# Patient Record
Sex: FEMALE | Race: WHITE | Employment: FULL TIME | URBAN - NONMETROPOLITAN AREA
[De-identification: names, ages, dates, MRNs, and addresses within clinical notes are randomized per-mention and may not be internally consistent; named-entity substitution may affect disease eponyms.]

---

## 2017-07-31 ENCOUNTER — HOSPITAL ENCOUNTER (OUTPATIENT)
Age: 22
Setting detail: OBSERVATION
Discharge: HOME | End: 2017-07-31
Attending: OBSTETRICS & GYNECOLOGY | Admitting: OBSTETRICS & GYNECOLOGY
Payer: COMMERCIAL

## 2017-07-31 VITALS
TEMPERATURE: 97.3 F | DIASTOLIC BLOOD PRESSURE: 78 MMHG | HEIGHT: 67 IN | RESPIRATION RATE: 18 BRPM | HEART RATE: 109 BPM | OXYGEN SATURATION: 100 % | WEIGHT: 200 LBS | SYSTOLIC BLOOD PRESSURE: 114 MMHG | BODY MASS INDEX: 31.39 KG/M2

## 2017-07-31 PROCEDURE — G0378 HOSPITAL OBSERVATION PER HR: HCPCS

## 2017-07-31 PROCEDURE — G0379 DIRECT REFER HOSPITAL OBSERV: HCPCS

## 2017-09-02 ENCOUNTER — HOSPITAL ENCOUNTER (OUTPATIENT)
Age: 22
Setting detail: OBSERVATION
Discharge: HOME | End: 2017-09-02
Attending: OBSTETRICS & GYNECOLOGY | Admitting: OBSTETRICS & GYNECOLOGY
Payer: COMMERCIAL

## 2017-09-02 VITALS
SYSTOLIC BLOOD PRESSURE: 131 MMHG | HEART RATE: 114 BPM | BODY MASS INDEX: 32.33 KG/M2 | DIASTOLIC BLOOD PRESSURE: 80 MMHG | HEIGHT: 67 IN | TEMPERATURE: 98.8 F | WEIGHT: 206 LBS

## 2017-09-02 PROCEDURE — 84112 EVAL AMNIOTIC FLUID PROTEIN: CPT

## 2017-09-02 PROCEDURE — G0378 HOSPITAL OBSERVATION PER HR: HCPCS

## 2017-09-02 PROCEDURE — G0379 DIRECT REFER HOSPITAL OBSERV: HCPCS

## 2017-09-02 RX ORDER — ACETAMINOPHEN 500 MG
1000 TABLET ORAL EVERY 6 HOURS PRN
COMMUNITY
End: 2021-01-07

## 2017-09-03 LAB — AMNISURE PATIENT RESULT: NORMAL

## 2017-10-04 ENCOUNTER — HOSPITAL ENCOUNTER (INPATIENT)
Age: 22
LOS: 2 days | Discharge: HOME | End: 2017-10-07
Attending: OBSTETRICS & GYNECOLOGY | Admitting: OBSTETRICS & GYNECOLOGY
Payer: COMMERCIAL

## 2017-10-04 LAB
ABO: NORMAL
ANTIBODY SCREEN: NORMAL
HCT VFR BLD CALC: 32.7 % (ref 37–47)
HEMOGLOBIN: 10.9 GM/DL (ref 12–16)
MCH RBC QN AUTO: 27.3 PG (ref 27–31)
MCHC RBC AUTO-ENTMCNC: 33.2 GM/DL (ref 33–37)
MCV RBC AUTO: 82 FL (ref 81–99)
PDW BLD-RTO: 14.5 % (ref 11.5–14.5)
PLATELET # BLD: 158 THOU/MM3 (ref 130–400)
PMV BLD AUTO: 9.9 MCM (ref 7.4–10.4)
RBC # BLD: 3.98 MILL/MM3 (ref 4.2–5.4)
RH FACTOR: NORMAL
WBC # BLD: 8.4 THOU/MM3 (ref 4.8–10.8)

## 2017-10-04 PROCEDURE — 6360000002 HC RX W HCPCS: Performed by: OBSTETRICS & GYNECOLOGY

## 2017-10-04 PROCEDURE — 86900 BLOOD TYPING SEROLOGIC ABO: CPT

## 2017-10-04 PROCEDURE — 1220000001 HC SEMI PRIVATE L&D R&B

## 2017-10-04 PROCEDURE — 36415 COLL VENOUS BLD VENIPUNCTURE: CPT

## 2017-10-04 PROCEDURE — A6258 TRANSPARENT FILM >16<=48 IN: HCPCS

## 2017-10-04 PROCEDURE — 6360000002 HC RX W HCPCS

## 2017-10-04 PROCEDURE — 2580000003 HC RX 258: Performed by: OBSTETRICS & GYNECOLOGY

## 2017-10-04 PROCEDURE — 86592 SYPHILIS TEST NON-TREP QUAL: CPT

## 2017-10-04 PROCEDURE — 86901 BLOOD TYPING SEROLOGIC RH(D): CPT

## 2017-10-04 PROCEDURE — 86850 RBC ANTIBODY SCREEN: CPT

## 2017-10-04 PROCEDURE — 85027 COMPLETE CBC AUTOMATED: CPT

## 2017-10-04 RX ORDER — ACETAMINOPHEN 325 MG/1
650 TABLET ORAL EVERY 4 HOURS PRN
Status: DISCONTINUED | OUTPATIENT
Start: 2017-10-04 | End: 2017-10-05 | Stop reason: HOSPADM

## 2017-10-04 RX ORDER — TERBUTALINE SULFATE 1 MG/ML
0.25 INJECTION, SOLUTION SUBCUTANEOUS ONCE
Status: DISCONTINUED | OUTPATIENT
Start: 2017-10-04 | End: 2017-10-05 | Stop reason: HOSPADM

## 2017-10-04 RX ORDER — BUTORPHANOL TARTRATE 1 MG/ML
1 INJECTION, SOLUTION INTRAMUSCULAR; INTRAVENOUS
Status: DISCONTINUED | OUTPATIENT
Start: 2017-10-04 | End: 2017-10-05 | Stop reason: HOSPADM

## 2017-10-04 RX ORDER — METHYLERGONOVINE MALEATE 0.2 MG/ML
200 INJECTION INTRAVENOUS PRN
Status: DISCONTINUED | OUTPATIENT
Start: 2017-10-04 | End: 2017-10-05

## 2017-10-04 RX ORDER — SODIUM CHLORIDE, SODIUM LACTATE, POTASSIUM CHLORIDE, CALCIUM CHLORIDE 600; 310; 30; 20 MG/100ML; MG/100ML; MG/100ML; MG/100ML
INJECTION, SOLUTION INTRAVENOUS CONTINUOUS
Status: DISCONTINUED | OUTPATIENT
Start: 2017-10-04 | End: 2017-10-05

## 2017-10-04 RX ORDER — ONDANSETRON 2 MG/ML
8 INJECTION INTRAMUSCULAR; INTRAVENOUS EVERY 6 HOURS PRN
Status: DISCONTINUED | OUTPATIENT
Start: 2017-10-04 | End: 2017-10-05 | Stop reason: HOSPADM

## 2017-10-04 RX ORDER — DIPHENHYDRAMINE HYDROCHLORIDE 50 MG/ML
25 INJECTION INTRAMUSCULAR; INTRAVENOUS EVERY 4 HOURS PRN
Status: DISCONTINUED | OUTPATIENT
Start: 2017-10-04 | End: 2017-10-05

## 2017-10-04 RX ORDER — CARBOPROST TROMETHAMINE 250 UG/ML
250 INJECTION, SOLUTION INTRAMUSCULAR PRN
Status: DISCONTINUED | OUTPATIENT
Start: 2017-10-04 | End: 2017-10-05 | Stop reason: HOSPADM

## 2017-10-04 RX ORDER — LIDOCAINE HYDROCHLORIDE 10 MG/ML
30 INJECTION, SOLUTION EPIDURAL; INFILTRATION; INTRACAUDAL; PERINEURAL PRN
Status: DISCONTINUED | OUTPATIENT
Start: 2017-10-04 | End: 2017-10-05 | Stop reason: HOSPADM

## 2017-10-04 RX ORDER — MISOPROSTOL 200 UG/1
1000 TABLET ORAL PRN
Status: DISCONTINUED | OUTPATIENT
Start: 2017-10-04 | End: 2017-10-05

## 2017-10-04 RX ORDER — IBUPROFEN 800 MG/1
800 TABLET ORAL EVERY 8 HOURS PRN
Status: DISCONTINUED | OUTPATIENT
Start: 2017-10-04 | End: 2017-10-05

## 2017-10-04 RX ADMIN — SODIUM CHLORIDE, POTASSIUM CHLORIDE, SODIUM LACTATE AND CALCIUM CHLORIDE: 600; 310; 30; 20 INJECTION, SOLUTION INTRAVENOUS at 22:57

## 2017-10-04 RX ADMIN — SODIUM CHLORIDE, POTASSIUM CHLORIDE, SODIUM LACTATE AND CALCIUM CHLORIDE: 600; 310; 30; 20 INJECTION, SOLUTION INTRAVENOUS at 22:00

## 2017-10-04 RX ADMIN — Medication 1 MILLI-UNITS/MIN: at 22:09

## 2017-10-04 RX ADMIN — BUTORPHANOL TARTRATE 1 MG: 1 INJECTION, SOLUTION INTRAMUSCULAR; INTRAVENOUS at 23:54

## 2017-10-04 ASSESSMENT — PAIN SCALES - GENERAL: PAINLEVEL_OUTOF10: 5

## 2017-10-05 ENCOUNTER — ANESTHESIA (OUTPATIENT)
Dept: LABOR AND DELIVERY | Age: 22
End: 2017-10-05
Payer: COMMERCIAL

## 2017-10-05 ENCOUNTER — ANESTHESIA EVENT (OUTPATIENT)
Dept: LABOR AND DELIVERY | Age: 22
End: 2017-10-05
Payer: COMMERCIAL

## 2017-10-05 LAB — RPR: NONREACTIVE

## 2017-10-05 PROCEDURE — 2580000003 HC RX 258: Performed by: OBSTETRICS & GYNECOLOGY

## 2017-10-05 PROCEDURE — 2500000003 HC RX 250 WO HCPCS: Performed by: ANESTHESIOLOGY

## 2017-10-05 PROCEDURE — 1200000000 HC SEMI PRIVATE

## 2017-10-05 PROCEDURE — 7200000001 HC VAGINAL DELIVERY

## 2017-10-05 PROCEDURE — 3700000025 ANESTHESIA EPIDURAL BLOCK: Performed by: ANESTHESIOLOGY

## 2017-10-05 PROCEDURE — 6370000000 HC RX 637 (ALT 250 FOR IP): Performed by: OBSTETRICS & GYNECOLOGY

## 2017-10-05 RX ORDER — IBUPROFEN 800 MG/1
800 TABLET ORAL 3 TIMES DAILY
Status: DISCONTINUED | OUTPATIENT
Start: 2017-10-05 | End: 2017-10-07 | Stop reason: HOSPADM

## 2017-10-05 RX ORDER — SODIUM CHLORIDE, SODIUM LACTATE, POTASSIUM CHLORIDE, CALCIUM CHLORIDE 600; 310; 30; 20 MG/100ML; MG/100ML; MG/100ML; MG/100ML
INJECTION, SOLUTION INTRAVENOUS CONTINUOUS
Status: DISCONTINUED | OUTPATIENT
Start: 2017-10-05 | End: 2017-10-07 | Stop reason: HOSPADM

## 2017-10-05 RX ORDER — LANOLIN 100 %
OINTMENT (GRAM) TOPICAL PRN
Status: DISCONTINUED | OUTPATIENT
Start: 2017-10-05 | End: 2017-10-07 | Stop reason: HOSPADM

## 2017-10-05 RX ORDER — HYDROCODONE BITARTRATE AND ACETAMINOPHEN 5; 325 MG/1; MG/1
2 TABLET ORAL EVERY 4 HOURS PRN
Status: DISCONTINUED | OUTPATIENT
Start: 2017-10-05 | End: 2017-10-07 | Stop reason: HOSPADM

## 2017-10-05 RX ORDER — SODIUM CHLORIDE 0.9 % (FLUSH) 0.9 %
10 SYRINGE (ML) INJECTION EVERY 12 HOURS SCHEDULED
Status: DISCONTINUED | OUTPATIENT
Start: 2017-10-05 | End: 2017-10-07 | Stop reason: HOSPADM

## 2017-10-05 RX ORDER — HYDROCODONE BITARTRATE AND ACETAMINOPHEN 5; 325 MG/1; MG/1
1 TABLET ORAL EVERY 4 HOURS PRN
Status: DISCONTINUED | OUTPATIENT
Start: 2017-10-05 | End: 2017-10-07 | Stop reason: HOSPADM

## 2017-10-05 RX ORDER — ACETAMINOPHEN 325 MG/1
650 TABLET ORAL EVERY 4 HOURS PRN
Status: DISCONTINUED | OUTPATIENT
Start: 2017-10-05 | End: 2017-10-07 | Stop reason: HOSPADM

## 2017-10-05 RX ORDER — MORPHINE SULFATE 2 MG/ML
4 INJECTION, SOLUTION INTRAMUSCULAR; INTRAVENOUS
Status: DISCONTINUED | OUTPATIENT
Start: 2017-10-05 | End: 2017-10-07 | Stop reason: HOSPADM

## 2017-10-05 RX ORDER — ONDANSETRON 2 MG/ML
4 INJECTION INTRAMUSCULAR; INTRAVENOUS EVERY 6 HOURS PRN
Status: DISCONTINUED | OUTPATIENT
Start: 2017-10-05 | End: 2017-10-07 | Stop reason: HOSPADM

## 2017-10-05 RX ORDER — CARBOPROST TROMETHAMINE 250 UG/ML
250 INJECTION, SOLUTION INTRAMUSCULAR
Status: ACTIVE | OUTPATIENT
Start: 2017-10-05 | End: 2017-10-05

## 2017-10-05 RX ORDER — SODIUM CHLORIDE 0.9 % (FLUSH) 0.9 %
10 SYRINGE (ML) INJECTION PRN
Status: DISCONTINUED | OUTPATIENT
Start: 2017-10-05 | End: 2017-10-07 | Stop reason: HOSPADM

## 2017-10-05 RX ORDER — MORPHINE SULFATE 2 MG/ML
2 INJECTION, SOLUTION INTRAMUSCULAR; INTRAVENOUS
Status: DISCONTINUED | OUTPATIENT
Start: 2017-10-05 | End: 2017-10-07 | Stop reason: HOSPADM

## 2017-10-05 RX ORDER — DIPHENHYDRAMINE HCL 25 MG
25 TABLET ORAL EVERY 4 HOURS PRN
Status: DISCONTINUED | OUTPATIENT
Start: 2017-10-05 | End: 2017-10-07 | Stop reason: HOSPADM

## 2017-10-05 RX ORDER — METHYLERGONOVINE MALEATE 0.2 MG/ML
200 INJECTION INTRAVENOUS SEE ADMIN INSTRUCTIONS
Status: DISCONTINUED | OUTPATIENT
Start: 2017-10-05 | End: 2017-10-07 | Stop reason: HOSPADM

## 2017-10-05 RX ORDER — FERROUS SULFATE 325(65) MG
325 TABLET ORAL
Status: DISCONTINUED | OUTPATIENT
Start: 2017-10-06 | End: 2017-10-07 | Stop reason: HOSPADM

## 2017-10-05 RX ORDER — ZOLPIDEM TARTRATE 10 MG/1
10 TABLET ORAL NIGHTLY PRN
Status: DISCONTINUED | OUTPATIENT
Start: 2017-10-05 | End: 2017-10-07 | Stop reason: HOSPADM

## 2017-10-05 RX ORDER — ONDANSETRON 4 MG/1
8 TABLET, FILM COATED ORAL EVERY 8 HOURS PRN
Status: DISCONTINUED | OUTPATIENT
Start: 2017-10-05 | End: 2017-10-07 | Stop reason: HOSPADM

## 2017-10-05 RX ORDER — DOCUSATE SODIUM 100 MG/1
100 CAPSULE, LIQUID FILLED ORAL 2 TIMES DAILY PRN
Status: DISCONTINUED | OUTPATIENT
Start: 2017-10-05 | End: 2017-10-07 | Stop reason: HOSPADM

## 2017-10-05 RX ADMIN — Medication 15 ML/HR: at 09:35

## 2017-10-05 RX ADMIN — ACETAMINOPHEN 650 MG: 325 TABLET ORAL at 21:31

## 2017-10-05 RX ADMIN — SODIUM CHLORIDE, POTASSIUM CHLORIDE, SODIUM LACTATE AND CALCIUM CHLORIDE: 600; 310; 30; 20 INJECTION, SOLUTION INTRAVENOUS at 12:22

## 2017-10-05 RX ADMIN — DOCUSATE SODIUM 100 MG: 100 CAPSULE, LIQUID FILLED ORAL at 21:32

## 2017-10-05 RX ADMIN — SODIUM CHLORIDE, POTASSIUM CHLORIDE, SODIUM LACTATE AND CALCIUM CHLORIDE: 600; 310; 30; 20 INJECTION, SOLUTION INTRAVENOUS at 07:14

## 2017-10-05 RX ADMIN — IBUPROFEN 800 MG: 800 TABLET, FILM COATED ORAL at 17:57

## 2017-10-05 ASSESSMENT — PAIN SCALES - GENERAL
PAINLEVEL_OUTOF10: 0
PAINLEVEL_OUTOF10: 3

## 2017-10-05 NOTE — FLOWSHEET NOTE
Dr Bartolo Almodovar text to please call, Md returned call notified pt requesting epidural, notified pt has scoliosis 20 percent per pts mother, no hx of surgeries, pt states she was stuck 6 times with her last baby. Md states he will be up.

## 2017-10-05 NOTE — FLOWSHEET NOTE
Dr. Lozada Route at nursing station and updated on pt's status. Most recent SVE @ 7245 6-7/80/0, pt experiencing a a pain on the Rt. Lower abdomen rated 4/10. Pt having variables MD at desk viewing monitor strip.

## 2017-10-05 NOTE — FLOWSHEET NOTE
Pt assist to the bathroom voided small amount, arlen care done, small vaginal bleeding. Gown changed.

## 2017-10-05 NOTE — FLOWSHEET NOTE
Dr Chata Naranjo updated on pt's status. Vag exam repeated and unchanged. MD viewing EFM strip from home. Periods of tachycardia reviewed with MD but fht's remain reactive. Orders received.

## 2017-10-05 NOTE — FLOWSHEET NOTE
Dr. Mirna Small at bedside to see if pt is comfortable. Pt stated that the last contraction was better.

## 2017-10-05 NOTE — FLOWSHEET NOTE
Pt here with c/o lower back pain since last night, vaginal pressure and mild contractions. Denies any vag bleeding or leaking of fluid. Reports +fm.

## 2017-10-05 NOTE — ANESTHESIA PRE PROCEDURE
WBC 8.4 10/04/2017    RBC 3.98 10/04/2017    RBC 4.60 03/27/2017    HGB 10.9 10/04/2017    HCT 32.7 10/04/2017    MCV 82.0 10/04/2017    RDW 14.5 10/04/2017     10/04/2017       CMP:   Lab Results   Component Value Date     04/12/2014    K 4.1 04/12/2014    CL 98 04/12/2014    CO2 26 04/12/2014    BUN 9 04/12/2014    CREATININE 0.4 04/12/2014    GLUCOSE 92 04/12/2014    PROT 6.7 09/20/2013    CALCIUM 9.3 04/12/2014    BILITOT 0.3 09/20/2013    ALKPHOS 62 09/20/2013    AST 23 09/20/2013    ALT 30 09/20/2013       POC Tests: No results for input(s): POCGLU, POCNA, POCK, POCCL, POCBUN, POCHEMO, POCHCT in the last 72 hours. Coags: No results found for: PROTIME, INR, APTT    HCG (If Applicable):   Lab Results   Component Value Date    PREGTESTUR NEGATIVE 08/04/2013    PREGSERUM NEGATIVE 09/30/2013        ABGs: No results found for: PHART, PO2ART, ZID3TPY, SQI5ZIE, BEART, H1URMZZU     Type & Screen (If Applicable):  Lab Results   Component Value Date    LABABO O 03/27/2017    [address] POS 10/04/2017       Anesthesia Evaluation    Airway: Mallampati: II        Dental:          Pulmonary:   (+) COPD:         Cardiovascular:        (-) hypertension      Rhythm: regular                   Neuro/Psych:      GI/Hepatic/Renal:           Endo/Other:          Abdominal:                    Anesthesia Plan    ASA 2     epidural     Anesthetic plan and risks discussed with patient. Plan discussed with CRNA.             Rimma Beyer MD   10/5/2017

## 2017-10-05 NOTE — FLOWSHEET NOTE
Placed pulse oximetry on patient to verify that US is picking up maternal HR. Pt found sitting up in bed at this time.

## 2017-10-05 NOTE — FLOWSHEET NOTE
Pt stating pain is now 5/10 in her right lower abdomen and feeling more pressure. Pt educated on PCA button with her epidural and pt beginning to utilize her PCA button.

## 2017-10-05 NOTE — FLOWSHEET NOTE
Upon entering room pt stated \"I just got sick a little bit ago\", pt's mother already disposed of the emesis. Zofran offered, pt declined.

## 2017-10-05 NOTE — L&D DELIVERY NOTE
Department of Obstetrics and Gynecology  Spontaneous Vaginal Delivery Note      Pt Name: Olivia Slater  MRN: [id number] [place] #: [de-identified]  YOB: 1995  Procedure Performed By: Terri Morales MD      Pre-operative Diagnosis:  Term pregnancy and Spontaneous labor  Post-operative Diagnosis: Same, delivered. Procedure:  Spontaneous vaginal delivery  Surgeon:  John Livingston    Information for the patient's :  Celina Altamirano [[id number]]          Anesthesia:  epidural anesthesia  Estimated blood loss:  400ml  Specimen:  Placenta sent to pathology   Complications:  CAN x 1  Condition:  infant stable to general nursery and mother stable    Details of Procedure: The patient is a 23 y.o. female at 38w11d   OB History      Para Term  AB Living    2 1 1 0 0 1    SAB TAB Ectopic Molar Multiple Live Births    0 0 0 0 0 1       who was admitted for latent labor. She received the following interventions: ARBOW and IV Pitocin augmentation. The patient progressed well,did receive an epidural, became complete and started to push. She was placed in the dorsal lithotomy position and prepped. She delivered the vertex over an intact perineum . The rest of the infant delivered atraumatically, placed on mother abdomen. The cord was clamped and cut and infant handed off to the waiting nurse for evaluation after mom had adequate time for bonding unless needed to be evaluated sooner. The placenta delivered intact, whole and that the umbilical cord had three vessels noted. The perineum and vagina were explored and a no lacerations were found. A vaginal sweep was performed and there were no retained products and 1 needle was  taken off the field. The count was correct.     Delivery Summary:  Information for the patient's :  Celina Altamirano [[id number]]        Labor & Delivery Summary  Dilation Complete Time: 4411       PMH:  Past Medical History:   Diagnosis Date  Depression 2012    took meds for a week    Postpartum hemorrhage 2014    just meds, no blood transfusion    Scoliosis 07/12/2012    20%    Trichomonas infection     11wks pregnant (2017)       Car Jones 10/5/2017 4:23 PM

## 2017-10-06 PROCEDURE — 6370000000 HC RX 637 (ALT 250 FOR IP): Performed by: OBSTETRICS & GYNECOLOGY

## 2017-10-06 PROCEDURE — 1200000000 HC SEMI PRIVATE

## 2017-10-06 RX ADMIN — IBUPROFEN 800 MG: 800 TABLET, FILM COATED ORAL at 01:32

## 2017-10-06 RX ADMIN — IBUPROFEN 800 MG: 800 TABLET, FILM COATED ORAL at 18:22

## 2017-10-06 RX ADMIN — IBUPROFEN 800 MG: 800 TABLET, FILM COATED ORAL at 09:10

## 2017-10-06 RX ADMIN — DOCUSATE SODIUM 100 MG: 100 CAPSULE, LIQUID FILLED ORAL at 09:10

## 2017-10-06 ASSESSMENT — PAIN SCALES - GENERAL
PAINLEVEL_OUTOF10: 4
PAINLEVEL_OUTOF10: 5
PAINLEVEL_OUTOF10: 4

## 2017-10-07 VITALS
BODY MASS INDEX: 32.96 KG/M2 | DIASTOLIC BLOOD PRESSURE: 77 MMHG | RESPIRATION RATE: 16 BRPM | WEIGHT: 210 LBS | HEART RATE: 88 BPM | TEMPERATURE: 98 F | HEIGHT: 67 IN | SYSTOLIC BLOOD PRESSURE: 116 MMHG | OXYGEN SATURATION: 97 %

## 2017-10-07 PROCEDURE — 6370000000 HC RX 637 (ALT 250 FOR IP): Performed by: OBSTETRICS & GYNECOLOGY

## 2017-10-07 RX ADMIN — IBUPROFEN 800 MG: 800 TABLET, FILM COATED ORAL at 07:21

## 2017-10-07 RX ADMIN — DOCUSATE SODIUM 100 MG: 100 CAPSULE, LIQUID FILLED ORAL at 08:21

## 2017-10-07 ASSESSMENT — PAIN SCALES - GENERAL: PAINLEVEL_OUTOF10: 3

## 2017-10-07 NOTE — FLOWSHEET NOTE
Postpartum education brochure given, teaching complete. [place] postpartum depression screening discussed with patient, instructed to contact her healthcare provider if her score is > 10. Patient voiced understanding. Mother's blood type is O+. Baby's blood type is O-.   Mother did not  receive Rhogam .

## 2017-10-07 NOTE — PROGRESS NOTES
Department of Obstetrics and Gynecology  Labor and Delivery  Attending Post Partum Progress Note    SUBJECTIVE:  PPD# 2    OBJECTIVE: Doing well     Vitals:  /83   Pulse 78   Temp 98 °F (36.7 °C) (Oral)   Resp 16   Ht 5' 7\" (1.702 m)   Wt 210 lb (95.3 kg)   SpO2 97%   Breastfeeding?  Unknown   BMI 32.89 kg/m²     ABDOMEN:  Soft, NT, ND, fundus firm      DATA:    Hemoglobin:   Lab Results   Component Value Date    HGB 10.9 10/04/2017    HCT 32.7 10/04/2017       ASSESSMENT & PLAN:    PPD#2  - Plan D/C home today    [address] [place] [address]

## 2017-10-07 NOTE — PLAN OF CARE
Problem: Anxiety:  Goal: Level of anxiety will decrease  Level of anxiety will decrease   Outcome: Ongoing  Pt denies any anxiety at this time. Problem: Breathing Pattern - Ineffective:  Goal: Able to breathe comfortably  Able to breathe comfortably   Outcome: Not Met This Shift  Pt denies SOB at this time. Problem: Fluid Volume - Imbalance:  Goal: Absence of intrapartum hemorrhage signs and symptoms  Absence of intrapartum hemorrhage signs and symptoms   Outcome: Ongoing  No signs/symptoms of bleeding at this time. Problem: Infection - Intrapartum Infection:  Goal: Will show no infection signs and symptoms  Will show no infection signs and symptoms   Outcome: Ongoing  Pt is afebrile. Problem: Labor Process - Prolonged:  Goal: Uterine contractions within specified parameters  Uterine contractions within specified parameters   Outcome: Ongoing  Pt is on pitocin and it is being titrated for a desirable ctx pattern. Problem: Pain - Acute:  Goal: Able to cope with pain  Able to cope with pain   Outcome: Ongoing  Pt rates 5/10 abdominal pain with ctx and states \"it's bearable\" at this time. Pt plans for an epidural after Dr. Denise Rod breaks her water. Problem: Tissue Perfusion - Uteroplacental, Altered:  Goal: Absence of abnormal fetal heart rate pattern  Absence of abnormal fetal heart rate pattern   Outcome: Ongoing  FHT's reactive. Problem: Urinary Retention:  Goal: Urinary elimination within specified parameters  Urinary elimination within specified parameters   Outcome: Ongoing  Pt is able to void without difficulty at this time. Problem: Falls - Risk of:  Goal: Will remain free from falls  Will remain free from falls   Outcome: Ongoing  Pt is free of falls at this time. Problem: Discharge Planning:  Goal: Discharged to appropriate level of care  Discharged to appropriate level of care   Outcome: Ongoing  Plan is to transfer pt to mom/baby two hours post delivery.     Comments:   Care
Problem: Fluid Volume - Imbalance:  Goal: Absence of postpartum hemorrhage signs and symptoms  Absence of postpartum hemorrhage signs and symptoms   Outcome: Ongoing  No signs of postpartum hemorrhage    Problem: Pain - Acute:  Goal: Pain level will decrease  Pain level will decrease   Outcome: Ongoing  Pt taking pain medication with relief    Problem: Discharge Planning:  Goal: Discharged to appropriate level of care  Discharged to appropriate level of care   Outcome: Ongoing  No discharge for this shift    Problem: Constipation:  Goal: Bowel elimination is within specified parameters  Bowel elimination is within specified parameters   Outcome: Ongoing  Pt with active bowel sounds and taking stool softeners    Problem: Infection - Risk of, Puerperal Infection:  Goal: Will show no infection signs and symptoms  Will show no infection signs and symptoms   Outcome: Ongoing  No signs of infection at this time    Problem: Mood - Altered:  Goal: Mood stable  Mood stable   Outcome: Ongoing  Pt appropriate with infant family and this RN    Problem: Pain:  Goal: Pain level will decrease  Pain level will decrease   Outcome: Ongoing  Pt taking pain medication with relief    Comments:   Care plan reviewed with patient and she contributes to goal setting and voices understanding of plan of care.
Problem: Fluid Volume - Imbalance:  Goal: Absence of postpartum hemorrhage signs and symptoms  Absence of postpartum hemorrhage signs and symptoms   Outcome: Ongoing  Small amount of lochia, 1 small clot this am    Problem: Pain - Acute:  Goal: Pain level will decrease  Pain level will decrease    Outcome: Ongoing  Pain goal met. Using motrin with relief    Problem: Falls - Risk of:  Goal: Absence of physical injury  Absence of physical injury   Outcome: Ongoing  Up in room as tolerated    Problem: Discharge Planning:  Goal: Discharged to appropriate level of care  Discharged to appropriate level of care   Outcome: Ongoing  Plan on discharge to home today    Problem: Constipation:  Goal: Bowel elimination is within specified parameters  Bowel elimination is within specified parameters   Outcome: Ongoing  No BM since delivery, taking colace as prescribed    Problem: Infection - Risk of, Puerperal Infection:  Goal: Will show no infection signs and symptoms  Will show no infection signs and symptoms   Outcome: Ongoing  Afebrile, VS stable    Problem: Mood - Altered:  Goal: Mood stable  Mood stable   Outcome: Ongoing  Calm and cooperative with staff and visitors, edinburgh scale given    Problem: Pain:  Goal: Pain level will decrease  Pain level will decrease    Outcome: Ongoing  Pain goal met. Using motrin with relief    Comments: Care plan reviewed with patient and she contributes to goal setting and voices understanding of plan of care.
Problem: Fluid Volume - Imbalance:  Goal: Absence of postpartum hemorrhage signs and symptoms  Absence of postpartum hemorrhage signs and symptoms  Outcome: Ongoing  Small amount of vaginal bleeding    Problem: Pain - Acute:  Goal: Pain level will decrease  Pain level will decrease  Outcome: Ongoing  Denies pain this shift. Problem: Falls - Risk of:  Goal: Will remain free from falls  Will remain free from falls  Outcome: Ongoing  Pt free of falls this shift. Problem: Discharge Planning:  Goal: Discharged to appropriate level of care  Discharged to appropriate level of care  Outcome: Ongoing  Working towards discharge, ducks in a row discussed    Problem: Constipation:  Goal: Bowel elimination is within specified parameters  Bowel elimination is within specified parameters  Outcome: Ongoing  Discussed colace ordered, encouraged to increase fluids and fiber in diet    Problem: Infection - Risk of, Puerperal Infection:  Goal: Will show no infection signs and symptoms  Will show no infection signs and symptoms  Outcome: Ongoing  Vitals wnl this shift. Problem: Mood - Altered:  Goal: Mood stable  Mood stable  Outcome: Ongoing  Mood quiet, pleasant and  cooperative. Comments:   Care plan reviewed with patient. Patient verbalized understanding of the plan of care and contribute to goal setting.
free from falls  Outcome: Ongoing  Pt. Remains free from falls at this time. IV infusing per order. RN encouraged pt. To call for assistance to BR. Side rails up X2. Call light within reach. Family at bedside. RN will continue to provide for a safe environment. Problem: Discharge Planning:  Goal: Discharged to appropriate level of care  Discharged to appropriate level of care  Outcome: Ongoing  Pt aware of 2hr recovery period in L&D and then transfer to mom/baby for the remainder of her stay. Comments:   Care plan reviewed with patient, her mother, and sisters. Patient verbalizes understanding of the plan of care and contribute to goal setting.

## 2021-01-07 ENCOUNTER — NURSE ONLY (OUTPATIENT)
Dept: LAB | Age: 26
End: 2021-01-07

## 2021-01-07 ENCOUNTER — OFFICE VISIT (OUTPATIENT)
Dept: FAMILY MEDICINE CLINIC | Age: 26
End: 2021-01-07

## 2021-01-07 VITALS
OXYGEN SATURATION: 99 % | SYSTOLIC BLOOD PRESSURE: 104 MMHG | RESPIRATION RATE: 16 BRPM | DIASTOLIC BLOOD PRESSURE: 70 MMHG | BODY MASS INDEX: 29.19 KG/M2 | WEIGHT: 186 LBS | HEIGHT: 67 IN | TEMPERATURE: 97.9 F | HEART RATE: 80 BPM

## 2021-01-07 DIAGNOSIS — Z87.42 HISTORY OF ABNORMAL CERVICAL PAP SMEAR: ICD-10-CM

## 2021-01-07 DIAGNOSIS — D64.9 ANEMIA, UNSPECIFIED TYPE: ICD-10-CM

## 2021-01-07 DIAGNOSIS — K59.01 SLOW TRANSIT CONSTIPATION: ICD-10-CM

## 2021-01-07 DIAGNOSIS — R10.30 LOWER ABDOMINAL PAIN: Primary | ICD-10-CM

## 2021-01-07 LAB
BASOPHILS # BLD: 0.2 %
BASOPHILS ABSOLUTE: 0 THOU/MM3 (ref 0–0.1)
BILIRUBIN URINE: NEGATIVE
BLOOD URINE, POC: NEGATIVE
CHARACTER, URINE: ABNORMAL
COLOR, URINE: ABNORMAL
EOSINOPHIL # BLD: 1 %
EOSINOPHILS ABSOLUTE: 0.1 THOU/MM3 (ref 0–0.4)
ERYTHROCYTE [DISTWIDTH] IN BLOOD BY AUTOMATED COUNT: 12 % (ref 11.5–14.5)
ERYTHROCYTE [DISTWIDTH] IN BLOOD BY AUTOMATED COUNT: 39.2 FL (ref 35–45)
GLUCOSE URINE: NEGATIVE MG/DL
HCT VFR BLD CALC: 43.1 % (ref 37–47)
HEMOGLOBIN: 14.1 GM/DL (ref 12–16)
IMMATURE GRANS (ABS): 0.01 THOU/MM3 (ref 0–0.07)
IMMATURE GRANULOCYTES: 0.2 %
KETONES, URINE: NEGATIVE
LEUKOCYTE CLUMPS, URINE: NEGATIVE
LYMPHOCYTES # BLD: 32.5 %
LYMPHOCYTES ABSOLUTE: 1.9 THOU/MM3 (ref 1–4.8)
MCH RBC QN AUTO: 29.1 PG (ref 26–33)
MCHC RBC AUTO-ENTMCNC: 32.7 GM/DL (ref 32.2–35.5)
MCV RBC AUTO: 89 FL (ref 81–99)
MONOCYTES # BLD: 7.8 %
MONOCYTES ABSOLUTE: 0.5 THOU/MM3 (ref 0.4–1.3)
NITRITE, URINE: NEGATIVE
NUCLEATED RED BLOOD CELLS: 0 /100 WBC
PH, URINE: 6 (ref 5–9)
PLATELET # BLD: 184 THOU/MM3 (ref 130–400)
PMV BLD AUTO: 10.3 FL (ref 9.4–12.4)
PROTEIN, URINE: NEGATIVE MG/DL
RBC # BLD: 4.84 MILL/MM3 (ref 4.2–5.4)
SEG NEUTROPHILS: 58.3 %
SEGMENTED NEUTROPHILS ABSOLUTE COUNT: 3.4 THOU/MM3 (ref 1.8–7.7)
SPECIFIC GRAVITY, URINE: 1.02 (ref 1–1.03)
UROBILINOGEN, URINE: 0.2 EU/DL (ref 0–1)
WBC # BLD: 5.9 THOU/MM3 (ref 4.8–10.8)

## 2021-01-07 PROCEDURE — 99203 OFFICE O/P NEW LOW 30 MIN: CPT | Performed by: STUDENT IN AN ORGANIZED HEALTH CARE EDUCATION/TRAINING PROGRAM

## 2021-01-07 PROCEDURE — 81003 URINALYSIS AUTO W/O SCOPE: CPT | Performed by: STUDENT IN AN ORGANIZED HEALTH CARE EDUCATION/TRAINING PROGRAM

## 2021-01-07 RX ORDER — MEDROXYPROGESTERONE ACETATE 150 MG/ML
150 INJECTION, SUSPENSION INTRAMUSCULAR
COMMUNITY

## 2021-01-07 RX ORDER — DOCUSATE SODIUM 100 MG/1
100 CAPSULE, LIQUID FILLED ORAL 2 TIMES DAILY
Qty: 60 CAPSULE | Refills: 0 | Status: SHIPPED | OUTPATIENT
Start: 2021-01-07 | End: 2021-02-06

## 2021-01-07 SDOH — ECONOMIC STABILITY: TRANSPORTATION INSECURITY
IN THE PAST 12 MONTHS, HAS THE LACK OF TRANSPORTATION KEPT YOU FROM MEDICAL APPOINTMENTS OR FROM GETTING MEDICATIONS?: NO

## 2021-01-07 SDOH — ECONOMIC STABILITY: INCOME INSECURITY: HOW HARD IS IT FOR YOU TO PAY FOR THE VERY BASICS LIKE FOOD, HOUSING, MEDICAL CARE, AND HEATING?: NOT HARD AT ALL

## 2021-01-07 ASSESSMENT — PATIENT HEALTH QUESTIONNAIRE - PHQ9
1. LITTLE INTEREST OR PLEASURE IN DOING THINGS: 3
8. MOVING OR SPEAKING SO SLOWLY THAT OTHER PEOPLE COULD HAVE NOTICED. OR THE OPPOSITE, BEING SO FIGETY OR RESTLESS THAT YOU HAVE BEEN MOVING AROUND A LOT MORE THAN USUAL: 0
2. FEELING DOWN, DEPRESSED OR HOPELESS: 2
SUM OF ALL RESPONSES TO PHQ QUESTIONS 1-9: 9
SUM OF ALL RESPONSES TO PHQ9 QUESTIONS 1 & 2: 5
3. TROUBLE FALLING OR STAYING ASLEEP: 2

## 2021-01-07 NOTE — PROGRESS NOTES
Health Maintenance Due   Topic Date Due    Hepatitis C screen  1995    Varicella vaccine (1 of 2 - 2-dose childhood series) 02/10/1998 patient had chicken pox    HPV vaccine (1 - 2-dose series) 02/10/2008 patient unsure if she was vaccinated    Chlamydia screen  04/12/2015    Cervical cancer screen  02/10/2018 patient had completed at health department 2019 2 [facility] Flu vaccine (1) 09/01/2020 patient does not want vaccine     DTaP/Tdap/Td vaccine (6 - Td) 10/18/2020 patient declines     Patient ALAN

## 2021-01-07 NOTE — PROGRESS NOTES
Traci Martino is a 23 y.o. female who presents today for:  Chief Complaint   Patient presents with    New Patient    Abdominal Pain     lower, cramping x 2 months    Rectal Bleeding     x2 time in november and 2 times in December. HPI:   29-year-old  female with past medical history of scoliosis, postpartum hemorrhage, and depression who is here to establish care. Last CBC in  demonstrated anemia, hemoglobin 10.9. She has had lower abdominal pain since November, with 2 episodes of rectal bleeding in November and 2 episodes in December. She states that blood is bright red on the toilet paper. She denies rectal pain. She has 1 hard bowel movement every day/every other day and has straining with bowel movement. She had STD testing in November. She denies any new sexual partner since November. She states that she had a history of abnormal Pap smear in  or . She previously saw Dr. Ozzie Mar as her OB/GYN. On depo for birth control. Mother has a history of cervical cancer. Denies vaginal bleeding, itching, discharge, or odor. She denies dysuria, fevers, chills. She states that she drinks 1 water bottle per day but does drink lots of iced coffee. Vaccines at [facility]. Previously saw David Nicholas. She lives with her parents, she has a 10year-old and 1year-old daughter. She works second shift at [employer]. Objective:   Physical Exam  Vitals signs and nursing note reviewed. Constitutional:       General: She is not in acute distress. Appearance: She is well-developed. She is not ill-appearing or diaphoretic. HENT:      Head: Normocephalic and atraumatic. Right Ear: External ear normal.      Left Ear: External ear normal.      Nose: Nose normal.   Eyes:      General: No scleral icterus. Right eye: No discharge. Left eye: No discharge. Conjunctiva/sclera: Conjunctivae normal.   Neck:      Musculoskeletal: Normal range of motion. Cardiovascular:      Rate and Rhythm: Normal rate and regular rhythm. Heart sounds: Normal heart sounds. No murmur. Pulmonary:      Effort: Pulmonary effort is normal.      Breath sounds: Normal breath sounds. Abdominal:      General: Abdomen is flat. There is no distension. Palpations: Abdomen is soft. Tenderness: There is abdominal tenderness (RLQ). There is no right CVA tenderness, left CVA tenderness, guarding or rebound. Genitourinary:     General: Normal vulva. Vagina: No bleeding or lesions. Cervix: Discharge (white thin discharge ), friability and cervical bleeding present. No cervical motion tenderness or lesion. Adnexa:         Left: Tenderness present. Rectum: Normal. No anal fissure or external hemorrhoid. Skin:     General: Skin is warm and dry. Findings: No erythema or rash. Neurological:      Mental Status: She is alert and oriented to person, place, and time. Psychiatric:         Behavior: Behavior normal.         Thought Content: Thought content normal.         Judgment: Judgment normal.           Assessment / Plan:     Fabiola Martini was seen today for new patient, abdominal pain and rectal bleeding. Diagnoses and all orders for this visit:    Lower abdominal pain  Slow transit constipation  -     docusate sodium (COLACE) 100 MG capsule;  Take 1 capsule by mouth 2 times daily  -     POCT Urinalysis No Micro (Auto): negative (dark urine) no leukocytes, LE   -Increase water intake to 3-4 bottles of water daily, begin fiber supplementation, Colace sent to the pharmacy   -Follow-up in 1 month    Anemia, unspecified type  -     CBC With Auto Differential; Future  Likely was previously due to dilution of pregnancy, but repeat CBC to ensure normalization    History of abnormal cervical Pap smear  -     TX OBTAINING SCREEN PAP SMEAR  -     PAP SMEAR        -     Wet mount negative for whiff test and yeast/BV Return in about 4 weeks (around 2/4/2021) for Follow up . Medications Prescribed:  Orders Placed This Encounter   Medications    docusate sodium (COLACE) 100 MG capsule     Sig: Take 1 capsule by mouth 2 times daily     Dispense:  60 capsule     Refill:  0       Future Appointments   Date Time Provider Andrea Jama   2/8/2021 10:40 AM [facility]       Patient given educational materials - see patient instructions. Discussed use, benefit, and sideeffects of prescribed medications. All patient questions answered. Pt voiced understanding. Reviewed health maintenance. Instructed to continue current medications, diet and exercise. Patient agreed with treatment plan. Follow up as directed.      Electronically signed by Natanael Taylor DO on 1/7/2021 at 10:12 AM

## 2021-01-07 NOTE — PROGRESS NOTES
S: 23 y.o. female with   Chief Complaint   Patient presents with    New Patient    Abdominal Pain     lower, cramping x 2 months    Rectal Bleeding     x2 time in november and 2 times in December. HPI: est care. Lower abd pressure and pain x 2 months. Bleeding with bms occasionally in the past 2 months. Std testing nov neg. H/o abnormal pap. BP Readings from Last 3 Encounters:   01/07/21 104/70   10/07/17 116/77   09/02/17 131/80     Wt Readings from Last 3 Encounters:   01/07/21 186 lb (84.4 kg)   10/04/17 210 lb (95.3 kg)   09/02/17 206 lb (93.4 kg)           O: VS:  height is 5' 6.93\" (1.7 m) and weight is 186 lb (84.4 kg). Her skin temperature is 97.9 °F (36.6 °C). Her blood pressure is 104/70 and her pulse is 80. Her respiration is 16 and oxygen saturation is 99%. AAO/NAD, appropriate affect for mood       Diagnosis Orders   1. Lower abdominal pain  docusate sodium (COLACE) 100 MG capsule    POCT Urinalysis No Micro (Auto)   2. Slow transit constipation  docusate sodium (COLACE) 100 MG capsule   3. Anemia, unspecified type  CBC With Auto Differential   4. History of abnormal cervical Pap smear  DE OBTAINING SCREEN PAP SMEAR    PAP SMEAR       Plan:  Increase water intake. Start stool softener and miralax daily. Send pap. Wet prep today. ua dip today. Repeat cbc. Health Maintenance Due   Topic Date Due    Hepatitis C screen  1997    Varicella vaccine (1 of 2 - 2-dose childhood series) 02/10/1998    HPV vaccine (1 - 2-dose series) 02/10/2008    Chlamydia screen  04/12/2015    Flu vaccine (1) 09/01/2020    DTaP/Tdap/Td vaccine (6 - Td) 10/18/2020         Attending Physician Statement  I have discussed the case, including pertinent history and exam findings with the resident. I agree with the documented assessment and plan as documented by the resident.         Jonnie Calvo DO 1/7/2021 9:59 AM

## 2021-01-13 ENCOUNTER — TELEPHONE (OUTPATIENT)
Dept: FAMILY MEDICINE CLINIC | Age: 26
End: 2021-01-13

## 2021-01-13 NOTE — TELEPHONE ENCOUNTER
[facility] [address] Dept. In regards to alvarado. She recieves her depo provera shot their and had been complaining of abdominal pain, nausea, and blood in stool. They scheduled her appointment on 1/7/21 calling and requesting OV to be faxed to [phone]. She can be reached at [phone] ext.  [address] [place] [address]

## 2021-01-14 NOTE — TELEPHONE ENCOUNTER
Patient notified and voiced understanding. Stated she will sign ALAN at her upcoming appointment. Denied any other questions or concerns at this time.

## 2021-01-20 DIAGNOSIS — B96.89 BACTERIAL VAGINOSIS: Primary | ICD-10-CM

## 2021-01-20 DIAGNOSIS — N76.0 BACTERIAL VAGINOSIS: Primary | ICD-10-CM

## 2021-01-20 LAB — CYTOLOGY THIN PREP PAP: NORMAL

## 2021-01-20 RX ORDER — METRONIDAZOLE 500 MG/1
500 TABLET ORAL 2 TIMES DAILY
Qty: 14 TABLET | Refills: 0 | Status: SHIPPED | OUTPATIENT
Start: 2021-01-20 | End: 2021-01-27

## 2021-01-28 ENCOUNTER — TELEPHONE (OUTPATIENT)
Dept: FAMILY MEDICINE CLINIC | Age: 26
End: 2021-01-28

## 2021-01-28 NOTE — TELEPHONE ENCOUNTER
Elizabeth from [place] [id number] left message on voice mailbox stating pt is seen by the health dept for birth control and the pt had voiced some concerns to Kelly Farr the pt stated she had an appointment with PCP on 1/7/21. Mayuri Baumann requesting progress note 1/7/21 making sure pt was seen and concerns addressed. Progress note faxed to [phone].

## 2021-02-08 ENCOUNTER — NURSE ONLY (OUTPATIENT)
Dept: LAB | Age: 26
End: 2021-02-08

## 2021-02-11 ENCOUNTER — OFFICE VISIT (OUTPATIENT)
Dept: FAMILY MEDICINE CLINIC | Age: 26
End: 2021-02-11
Payer: COMMERCIAL

## 2021-02-11 VITALS
HEIGHT: 67 IN | TEMPERATURE: 97.3 F | BODY MASS INDEX: 29.26 KG/M2 | HEART RATE: 89 BPM | SYSTOLIC BLOOD PRESSURE: 118 MMHG | RESPIRATION RATE: 12 BRPM | WEIGHT: 186.4 LBS | DIASTOLIC BLOOD PRESSURE: 76 MMHG | OXYGEN SATURATION: 99 %

## 2021-02-11 DIAGNOSIS — N87.1 MODERATE DYSPLASIA OF CERVIX (CIN II): ICD-10-CM

## 2021-02-11 DIAGNOSIS — F33.1 MODERATE EPISODE OF RECURRENT MAJOR DEPRESSIVE DISORDER (HCC): Primary | ICD-10-CM

## 2021-02-11 PROCEDURE — 99213 OFFICE O/P EST LOW 20 MIN: CPT | Performed by: STUDENT IN AN ORGANIZED HEALTH CARE EDUCATION/TRAINING PROGRAM

## 2021-02-11 ASSESSMENT — PATIENT HEALTH QUESTIONNAIRE - PHQ9
4. FEELING TIRED OR HAVING LITTLE ENERGY: 3
SUM OF ALL RESPONSES TO PHQ QUESTIONS 1-9: 16
6. FEELING BAD ABOUT YOURSELF - OR THAT YOU ARE A FAILURE OR HAVE LET YOURSELF OR YOUR FAMILY DOWN: 3
10. IF YOU CHECKED OFF ANY PROBLEMS, HOW DIFFICULT HAVE THESE PROBLEMS MADE IT FOR YOU TO DO YOUR WORK, TAKE CARE OF THINGS AT HOME, OR GET ALONG WITH OTHER PEOPLE: 2
3. TROUBLE FALLING OR STAYING ASLEEP: 2
SUM OF ALL RESPONSES TO PHQ QUESTIONS 1-9: 16
9. THOUGHTS THAT YOU WOULD BE BETTER OFF DEAD, OR OF HURTING YOURSELF: 0

## 2021-02-11 NOTE — PROGRESS NOTES
Kristopher Nelson is a 27 y. o.female    Chief Complaint   Patient presents with    1 Month Follow-Up     review labs completed 01/07/2021       Pt presents for 1 month follow up of abnormal PAP- had Colposcopy and likely needs LEEP in near future per Dr. Isac Roberson for PAMELA II-III. Today, pt complains of worsening mood, decreased interest, more fatigue. Has 2 young kids at home. No interest in meds at this time. Open to counseling. Patient Active Problem List   Diagnosis    Vaginal delivery    Hemorrhage after delivery of fetus       Current Outpatient Medications   Medication Sig Dispense Refill    medroxyPROGESTERone (DEPO-PROVERA) 150 MG/ML injection Inject 150 mg into the muscle every 3 months      Multiple Vitamins-Minerals (MULTIVITAL) CHEW Take 2 tablets by mouth daily. No current facility-administered medications for this visit. Review of Systems per Dr. Alexandra Sheridan     /76 (Site: Left Upper Arm, Position: Sitting, Cuff Size: Medium Adult)   Pulse 89   Temp 97.3 °F (36.3 °C) (Temporal)   Resp 12   Ht 5' 7\" (1.702 m)   Wt 186 lb 6.4 oz (84.6 kg)   SpO2 99%   BMI 29.19 kg/m²   BP Readings from Last 3 Encounters:   02/11/21 118/76   01/07/21 104/70   10/07/17 116/77       Wt Readings from Last 3 Encounters:   02/11/21 186 lb 6.4 oz (84.6 kg)   01/07/21 186 lb (84.4 kg)   10/04/17 210 lb (95.3 kg)     Body mass index is 29.19 kg/m². Physical Exam per Dr. Patti Pickens    No results found for: LABA1C    No results found for: CHOL, TRIG, HDL, LDLCALC, LDLDIRECT    The ASCVD Risk score (Claven ., et al., 2013) failed to calculate for the following reasons:     The 2013 ASCVD risk score is only valid for ages 36 to 78    Lab Results   Component Value Date     (L) 04/12/2014    K 4.1 04/12/2014    CL 98 04/12/2014    CO2 26 04/12/2014    BUN 9 04/12/2014    CREATININE 0.4 04/12/2014    GLUCOSE 92 04/12/2014    CALCIUM 9.3 04/12/2014    PROT 6.7 09/20/2013 LABALBU 3.9 09/20/2013    BILITOT 0.3 09/20/2013    ALKPHOS 62 09/20/2013    AST 23 09/20/2013    ALT 30 09/20/2013       Lab Results   Component Value Date    TSH 1.460 03/27/2017       Lab Results   Component Value Date    WBC 5.9 01/07/2021    HGB 14.1 01/07/2021    HCT 43.1 01/07/2021    MCV 89.0 01/07/2021     01/07/2021         Immunization History   Administered Date(s) Administered    DTaP 01/21/1998, 06/12/2000, 07/05/2001, 08/14/2002, 10/18/2010    Hepatitis B 1997, 01/21/1998, 06/12/2000    Hib, unspecified 01/21/1998, 06/12/2000    MMR 06/12/2000, 07/05/2001    Meningococcal MCV4P (Menactra) 08/01/2013    Polio IPV (IPOL) 01/21/1998, 06/12/2000, 07/05/2001    Tdap (Boostrix, Adacel) 10/18/2010       Health Maintenance   Topic Date Due    Hepatitis C screen  1997    Varicella vaccine (1 of 2 - 2-dose childhood series) 02/10/1998    HPV vaccine (1 - 2-dose series) 02/10/2008    Chlamydia screen  04/12/2015    Flu vaccine (1) 09/01/2020    DTaP/Tdap/Td vaccine (6 - Td) 10/18/2020    Cervical cancer screen  01/07/2024    Hepatitis B vaccine  Completed    Hib vaccine  Completed    Meningococcal (ACWY) vaccine  Completed    HIV screen  Completed    Hepatitis A vaccine  Aged Out    Pneumococcal 0-64 years Vaccine  Aged Out           Future Appointments   Date Time Provider Andrea Jama   3/25/2021 10:40 AM Marisa Marquis, DO SRPX  [facility] [place] [facility] [place] [address]       ASSESSMENT       Diagnosis Orders   1. Moderate episode of recurrent major depressive disorder ([address]  [address], [place], [facility], [place]   2. Moderate dysplasia of cervix (PAMELA II)         PLAN      After discussion with Dr. eBty Beckman, we agreed on plan as follows:    Refer to Ernesto Parish at this time for counseling  OK for intermittent FMLA. Consider CMP and free T4/TSH in future to rule out possible contributors to depression  Follow-up with OB/GYN as planned. Follow up in 6-8 weeks. Attending Physician Statement  I have discussed the case, including pertinent history and exam findings with the resident. I agree with the documented assessment and plan as documented by the resident.   GE modifier added  to this encounter     Electronically signed by Luis Kohli MD on 2/11/2021 at 6:25 PM

## 2021-02-11 NOTE — PROGRESS NOTES
June Fleming is a 27 y.o. female who presents today for:  Chief Complaint   Patient presents with    1 Month Follow-Up     review labs completed 01/07/2021         HPI:   29-year-old female with past medical history of abnormal Pap smear, anemia, lower abdominal pain and constipation who is here for follow-up on the above. At her last visit, we did a Pap smear that came back HSIL. And she was referred back to gynecology for colposcopy. Colposcopy was completed by Dr. Jaz Vann and demonstrates PAMELA 2-3 on the cervical biopsy and ECC was negative for dysplasia. CBC was within normal limits. She was recommended to take Colace and increase her water consumption. She reports that bowel movements have improved and she is having BM daily. However, she reports that her mood has worsened. She denies SI/HI. However, she has depressed mood, anhedonia, states that she doesn't want to get out of bed. She was diagnosed with depression at 15 yo. Not on medication, not interested in taking medication. Open to counseling. Pelvic pain improved after taking flagyl for BV. Objective:     Vitals:    02/11/21 1445   BP: 118/76   Pulse: 89   Resp: 12   Temp: 97.3 °F (36.3 °C)   SpO2: 99%       Physical Exam  Vitals signs and nursing note reviewed. Constitutional:       General: She is not in acute distress. Appearance: She is well-developed. She is not diaphoretic. HENT:      Head: Normocephalic and atraumatic. Right Ear: External ear normal.      Left Ear: External ear normal.      Nose: Nose normal.   Eyes:      General: No scleral icterus. Right eye: No discharge. Left eye: No discharge. Conjunctiva/sclera: Conjunctivae normal.   Neck:      Musculoskeletal: Normal range of motion. Cardiovascular:      Rate and Rhythm: Normal rate and regular rhythm. Heart sounds: Normal heart sounds. No murmur.    Pulmonary:      Effort: Pulmonary effort is normal. Breath sounds: Normal breath sounds. Abdominal:      Palpations: Abdomen is soft. Tenderness: There is no abdominal tenderness. Skin:     General: Skin is warm and dry. Findings: No erythema or rash. Neurological:      Mental Status: She is alert and oriented to person, place, and time. Psychiatric:         Attention and Perception: Attention normal.         Mood and Affect: Mood is depressed. Affect is flat. Behavior: Behavior is withdrawn. Thought Content: Thought content normal.         Judgment: Judgment normal.         Assessment / Plan:   Brandy Jimenez was seen today for 1 month follow-up. Diagnoses and all orders for this visit:    Moderate episode of recurrent major depressive disorder ([facility] [address]  -     [address], [employer], [address], [address], [address] in order to attend counseling sessions  -Okay to take [address] wort as supplement  -Declined medication  -Follow-up in 6 weeks or sooner if symptoms worsen  -TSH/T4 at next appointment     Moderate dysplasia of cervix (PAMELA II)  Continue follow-up with OB/GYN as recommended      Pap/Pelvic: 2021, HSIL           Return in about 6 weeks (around 3/25/2021). Medications Prescribed:  No orders of the defined types were placed in this encounter. Future Appointments   Date Time Provider Andrea Jama   3/25/2021 10:40 AM Luly Montero DO SRPX [facility]       Patient given educational materials - see patient instructions. Discussed use, benefit, and sideeffects of prescribed medications. All patient questions answered. Pt voiced understanding. Reviewed health maintenance. Instructed to continue current medications, diet and exercise. Patient agreed with treatment plan. Follow up as directed.      Electronically signed by Yunier Mancilla DO on 2/11/2021 at 5:24 PM

## 2021-02-12 ENCOUNTER — TELEPHONE (OUTPATIENT)
Dept: BEHAVIORAL/MENTAL HEALTH | Age: 26
End: 2021-02-12

## 2021-02-12 NOTE — TELEPHONE ENCOUNTER
[facility] received a call from:    Name of Caller: Nathen    Relationship to patient: Self    Organization name: [employer] contact number: [phone]    Reason for call:  Pt calling to set up a new pt appointment with Rickey Pat. She was referred by Dr. Daphne Contreras. Would like an in office visit but pt states she had a cold starting on 2/7 with congestion and a sore throat. Pt states she does not have a sore throat anymore and the congestion is going away as of 2/12/21. Please call back to schedule.

## 2021-02-18 ENCOUNTER — TELEPHONE (OUTPATIENT)
Dept: FAMILY MEDICINE CLINIC | Age: 26
End: 2021-02-18

## 2021-02-18 NOTE — TELEPHONE ENCOUNTER
Pt called, Dr. Richelle Mark missed one spot on her FMLA form for signature. Reprinted form, highlighted spot, placed in your mailbox, please sign and return to bin for faxing. Thank you!

## 2021-02-25 ENCOUNTER — OFFICE VISIT (OUTPATIENT)
Dept: BEHAVIORAL/MENTAL HEALTH CLINIC | Age: 26
End: 2021-02-25
Payer: COMMERCIAL

## 2021-02-25 DIAGNOSIS — F41.1 GENERALIZED ANXIETY DISORDER: Primary | ICD-10-CM

## 2021-02-25 DIAGNOSIS — F33.0 MILD EPISODE OF RECURRENT MAJOR DEPRESSIVE DISORDER (HCC): ICD-10-CM

## 2021-02-25 PROCEDURE — 90791 PSYCH DIAGNOSTIC EVALUATION: CPT | Performed by: SOCIAL WORKER

## 2021-02-25 ASSESSMENT — PATIENT HEALTH QUESTIONNAIRE - PHQ9
8. MOVING OR SPEAKING SO SLOWLY THAT OTHER PEOPLE COULD HAVE NOTICED. OR THE OPPOSITE, BEING SO FIGETY OR RESTLESS THAT YOU HAVE BEEN MOVING AROUND A LOT MORE THAN USUAL: 0
7. TROUBLE CONCENTRATING ON THINGS, SUCH AS READING THE NEWSPAPER OR WATCHING TELEVISION: 3
SUM OF ALL RESPONSES TO PHQ9 QUESTIONS 1 & 2: 4
SUM OF ALL RESPONSES TO PHQ QUESTIONS 1-9: 14
2. FEELING DOWN, DEPRESSED OR HOPELESS: 2
10. IF YOU CHECKED OFF ANY PROBLEMS, HOW DIFFICULT HAVE THESE PROBLEMS MADE IT FOR YOU TO DO YOUR WORK, TAKE CARE OF THINGS AT HOME, OR GET ALONG WITH OTHER PEOPLE: 1
1. LITTLE INTEREST OR PLEASURE IN DOING THINGS: 2

## 2021-02-25 ASSESSMENT — ANXIETY QUESTIONNAIRES
2. NOT BEING ABLE TO STOP OR CONTROL WORRYING: 3-NEARLY EVERY DAY
5. BEING SO RESTLESS THAT IT IS HARD TO SIT STILL: 0-NOT AT ALL
1. FEELING NERVOUS, ANXIOUS, OR ON EDGE: 2-OVER HALF THE DAYS
7. FEELING AFRAID AS IF SOMETHING AWFUL MIGHT HAPPEN: 3-NEARLY EVERY DAY

## 2021-02-25 NOTE — PATIENT INSTRUCTIONS
1. The Stress Response and How It Can Affect You   The stress response, or fight or flight response is the emergency reaction system of the body. It is there to keep you safe in emergencies. The stress response includes physical and thought responses to your perception of various situations. When the stress response is turned on, your body may release substances like adrenaline and cortisol. Your organs are programmed to respond in certain ways to situations that are viewed as challenging or threatening. The stress response can work against you. You can turn it on when you dont really need it and, as a result, perceive something as an emergency when its really not. It can turn on when you are just thinking about past or future events. Harmless, chronic conditions can be intensified by the stress response activating too often, with too much intensity, or for too long. Stress responses can be different for different individuals. Below is a list of some common stress related responses people have. ([place] the responses you have had in the last 2 weeks.)                                                                                                 Physical Responses   Muscle aches   Insomnia   ? Heart rate   Headache   Weight gain   Nausea   Constipation   Dry mouth   Muscle twitching  Weight loss   Low energy   Weakness   Tight chest   Diarrhea   Dizziness   Trembling   Stomach cramps  Chills    Hot flashes   Sweating   Pounding heart  Choking feeling  Chest pain   Leg cramps   Numb hands/feet Dry throat   Appetite change  Face flushing   ? Blood pressure  Light-headedness  Feeling faint        Trouble swallowing   Rash ?    Urination   Neck pain             Tingling hands/feet                                                                                             Emotional and Thought Responses   Restlessness   Agitation   Insecurity             Worthlessness   Anxiety   Stress    Depression Hopelessness   Guilt    Defensiveness  Anger            Racing thoughts   Nightmares   Intense thinking  Sensitivity            Expecting the worst   Numbness   Lack of motivation  Mood swings             Forgetfulness   ? Concentration  Rigidity              Preoccupation  Intolerance                                                Behavioral Responses   Avoidance   Withdrawal   Neglect   ? Alcohol use    Smoking   ? Eating   Arguing        Poor appearance   ? Spending   Poor hygiene   ? Eating   Seeking reassurance   Nail biting   Skin picking   ? Talking         ? Body checking   Sexual problems  Foot tapping   Fidgeting Rapid walking    ? Exercise   Teeth clenching           Multitasking   Aggressive speaking       ? Fun activities  ? Sleeping      ? Relaxing activities     Seeking information     The parasympathetic nervous system in your body is designed to turn on your bodys relaxation response. Your behaviors and thinking can keep your bodys natural relaxation response from operating at its best.     Getting your body to relax on a daily basis for at least brief periods can help decrease unpleasant stress responses. Learning to relax your body, through specific breathing and relaxation exercises as well as by minimizing stressful thinking, can help your bodys natural relaxation system be more effective. 2.   Deep Breathing Exercises      Exercise 1:  The Stimulating Breath (also called the Yael Breath)   Its aim is to raise energy level and increase alertness. - Inhale and exhale rapidly through your nose, keeping your mouth closed but relaxed. Your breaths in and out should be equal in duration, but as short as possible. This is a noisy breathing exercise. - Try for three in-and-out breath cycles per second. This produces a quick movement of the diaphragm, suggesting a yael. Breathe normally after each cycle. - Do not do for more than 15 seconds on your first try.  Each time you practice the powerful and negative impact on life, there has been an enormous amount of research conducted on how to reduce symptoms and improve functioning. As a result, we now know there are behaviors YOU can engage in to make yourself feel better. Depression is not a weakness  People suffer from depression for a variety of reasons, biological, environmental and behavioral.  Research indicates that [employer] is NOT one of the reasons people become depressed. Depression is not something you are powerless against  Evidence suggests that you can directly impact the intensity and duration of depression by what you do and by altering the way you think about certain things. The Downward Spiral    Depression often begins as a drop in mood due to an environmental or biological trigger that makes people feel less like being active. Being less active, in turn, often causes people to experience an even lower mood and feel even less like being active, and so the cycle begins. How can I start feeling better? The first and best way to reverse the downward cycle is to get active! Your body produces its own anti-depressants every time you exercise or do something pleasurable. Regular exercise is one of the very best ways to improve your mood. In fact some studies have shown that a solid exercise program is as effective as psychotherapy or anti-depressant medication for some people. *See physical activity section of handout    Force yourself to do something you found pleasurable before depression. This may be different for everyone and it doesnt matter if its gardening, playing bridge, walking, reading a novel, or simply talking to a close friend. What matters is that YOU find the activity pleasurable! Even if you dont feel like doing something pleasurable for yourself, DO IT ANYWAY. We call this the fake it until you make it principle. Educate yourself!  Often people feel powerless against medical Andrews Jones. Rafaeljoyce      Disputation:  Challenging Upsetting Thinking    Examine your thoughts for key words:  1. must, need, got to, have to, should (unrealistic standards)  2. never, always, completely, totally, all everything, everyone (predictions /  labeling)  3. awful, terrible, horrible, unbearable, disaster, worst ever (labeling / predictions)  4. jerk, slob, creep, hypocrite, bully, stupid (labels)  Dispute or question the accuracy of the questionable thoughts. 1. Am I upsetting myself unnecessarily? How can I see this another way? 2. Is my thinking working for or against me? How could I view this in a less upsetting way? 3. What am I demanding must happen? What do I want or prefer, rather than need? 4. Am I making something too terrible? Is it really that awful? What would be so terrible about that? 5. Am I labeling a person? What is the action that I dont like? 6. Whats untrue about my thoughts? How can I stick to the facts? Whats the proof for what I am thinking or believing about this? 7. Am I using extreme, black-and-white language? What less extreme words might be more accurate? 8. Am I fortune telling or mind reading in a way that gets me upset or unhappy? What are the odds (percent chance -- e.g., there is a 5% chance. ..) that it will really turn out the way Im thinking or imagining? 9. What are my options in this situation? How would I like to respond? 10. Create more moderate, helpful, or realistic statements to replace the upsetting ones. 11. Have I had any experiences that show that this thought might not be totally true? 12. If my best friend or someone I loved had this thought, what would I tell them? 15. If my best friend or someone I loved knew I was thinking this thought, what would they say to me? What evidence would they point out to me that would suggest that my thought is not completely true?   14. Are there strengths in me or positives in the situation that I am ignoring? Am I underestimating my ability to cope with unfortunate circumstances? 15. When I am not feeling this way, do I think about this situation any differently? How?  16. Have I been in this type of situation before? What happened? What have I learned from prior experiences that could help me now? 17. Five years from now, if I look back on this situation, will I look at it any differently? Will I focus on any different part of my experience? 25. Am I blaming myself for something over which I do not have complete control? 4. Pt will familiarize self with [facility]. 5. Pt will prioritize self care daily/weekly- relaxation, stress management, increased physical outlet, enjoyable activities, supportive relationship.   6. Pt will follow up with DANIELLE Raines

## 2021-02-26 NOTE — PROGRESS NOTES
Behavioral Health Consultation  PATRICIO Barker-S  2/25/2021  10:00 AM EST  This note will not be viewable in Vtapt for the following reason(s). This is a Psychotherapy Note. Time spent with Patient: 38minutes  This is patient's first  [facility] appointment. Reason for Consult:    Chief Complaint   Patient presents with    Anxiety    Depression     Referring Provider: DO Pancho Bennett [address], [address]    Pt provided informed consent for the behavioral health program. Discussed with patient model of service to include the limits of confidentiality (i.e. abuse reporting, suicide intervention, etc.) and short-term intervention focused approach. Pt indicated understanding. Feedback given to PCP. S:  Pt identified the presenting problem as anxiety and depression and indicated this as her primary needs to address through behavioral health services. The history of the problem was explored with pt in establishing a history of managing bouts of anxiety and depression. The current situation was processed with pt in examining thoughts, feelings, and impairment on daily functioning. Pt indicated symptoms of poor sleep, depressed mood,  feeling anxious/edgy, and ruminating on negative/worrisome thoughts. Pt was guided in exploring areas of behavioral change, development of effective coping strategies, and assessing the management of environmental factors influencing the problem. The PHQ-9 was administered and pt scored 14, indicating Mild major depression. The JULIETTE 7 scored at 14 indicating moderate Generalized anxiety disorder. Pt denied thoughts that she would be better off dead. Pt was advised of indications of depressive symptoms and instructed to monitor if symptoms worsen or interfere with daily functioning, to consider following-up with either your primary care team or a behavioral health provider for possible counseling or medication management.  If suicidal thoughts are experienced, call 911. An additional 24/7 resource is the [address] at [phone] ([phone]). Pt will attend a follow up appointment next week. O:  MSE:    Appearance    cooperative  Appetite normal  Sleep disturbance Yes  Fatigue Yes  Loss of pleasure Yes  Impulsive behavior No  Speech    normal volume  Mood    euthymic   Affect    normal affect  Thought Content    intact  Thought Process    coherent  Associations    logical connections  Insight    Fair  Judgment    Intact  Orientation    oriented to person, place, time, and general circumstances  Memory    recent and remote memory intact  Attention/Concentration    intact  Morbid ideation No  Suicide Assessment    no suicidal ideation    History:    TOBACCO:   reports that she has never smoked. She has never used smokeless tobacco.  ETOH:   reports current alcohol use. Family History:   Family History   Problem Relation Age of Onset    Diabetes Paternal Uncle     High Blood Pressure Mother     Cervical Cancer Mother     High Blood Pressure Father     Diabetes Maternal Grandmother     Diabetes Paternal Grandmother        A:       Diagnosis:    1. Generalized anxiety disorder    2. Mild episode of recurrent major depressive disorder ([facility] [address]          Diagnosis Date    Depression 2012    took meds for a week    Postpartum hemorrhage 2014    just meds, no blood transfusion    Scoliosis 07/12/2012    20%    Trichomonas infection     11wks pregnant (2017)       Plan: Pt will attend a follow up appointment next week  to assess symptoms and evaluate effectiveness of coping skills. Pt interventions:  Provided handout on  depression and anxiety, Trained in relaxation strategies and Discussed self-care (sleep, nutrition, rewarding activities, social support, exercise)    Pt Behavioral Change Plan:   1. 1. Pt will read handouts regarding depression, anxiety, relaxation techniques, and self care.   2. Pt will practice self calming skills 2-3x's daily for 3-5 minutes. 3. Pt will promote effective self care habits daily/weekly-rest, relaxation, stress management, supportive relationships, increased physical outlets, engagement in enjoyable activities.   4. Pt will follow up with DANIELLE Joshi

## 2021-03-10 PROBLEM — F33.1 MODERATE EPISODE OF RECURRENT MAJOR DEPRESSIVE DISORDER (HCC): Status: ACTIVE | Noted: 2021-03-10

## 2021-03-10 PROBLEM — N87.1 HIGH GRADE SQUAMOUS INTRAEPITHELIAL LESION (HGSIL), GRADE 2 CIN, ON BIOPSY OF CERVIX: Status: ACTIVE | Noted: 2021-03-10

## 2021-03-10 PROBLEM — F41.1 GENERALIZED ANXIETY DISORDER: Status: ACTIVE | Noted: 2021-03-10

## 2021-03-11 ENCOUNTER — OFFICE VISIT (OUTPATIENT)
Dept: FAMILY MEDICINE CLINIC | Age: 26
End: 2021-03-11

## 2021-03-11 ENCOUNTER — NURSE ONLY (OUTPATIENT)
Dept: LAB | Age: 26
End: 2021-03-11

## 2021-03-11 ENCOUNTER — OFFICE VISIT (OUTPATIENT)
Dept: BEHAVIORAL/MENTAL HEALTH CLINIC | Age: 26
End: 2021-03-11
Payer: COMMERCIAL

## 2021-03-11 VITALS
OXYGEN SATURATION: 100 % | SYSTOLIC BLOOD PRESSURE: 112 MMHG | DIASTOLIC BLOOD PRESSURE: 64 MMHG | WEIGHT: 187.2 LBS | TEMPERATURE: 97.9 F | HEART RATE: 82 BPM | RESPIRATION RATE: 16 BRPM | BODY MASS INDEX: 29.32 KG/M2

## 2021-03-11 DIAGNOSIS — F41.1 GENERALIZED ANXIETY DISORDER: ICD-10-CM

## 2021-03-11 DIAGNOSIS — N87.1 HIGH GRADE SQUAMOUS INTRAEPITHELIAL LESION (HGSIL), GRADE 2 CIN, ON BIOPSY OF CERVIX: ICD-10-CM

## 2021-03-11 DIAGNOSIS — F33.1 MODERATE EPISODE OF RECURRENT MAJOR DEPRESSIVE DISORDER (HCC): ICD-10-CM

## 2021-03-11 DIAGNOSIS — F41.1 GENERALIZED ANXIETY DISORDER: Primary | ICD-10-CM

## 2021-03-11 DIAGNOSIS — F33.0 MILD EPISODE OF RECURRENT MAJOR DEPRESSIVE DISORDER (HCC): ICD-10-CM

## 2021-03-11 DIAGNOSIS — F33.1 MODERATE EPISODE OF RECURRENT MAJOR DEPRESSIVE DISORDER (HCC): Primary | ICD-10-CM

## 2021-03-11 LAB
ALBUMIN SERPL-MCNC: 4.5 G/DL (ref 3.5–5.1)
ALP BLD-CCNC: 61 U/L (ref 38–126)
ALT SERPL-CCNC: 18 U/L (ref 11–66)
ANION GAP SERPL CALCULATED.3IONS-SCNC: 13 MEQ/L (ref 8–16)
AST SERPL-CCNC: 18 U/L (ref 5–40)
BILIRUB SERPL-MCNC: 0.4 MG/DL (ref 0.3–1.2)
BUN BLDV-MCNC: 8 MG/DL (ref 7–22)
CALCIUM SERPL-MCNC: 9.5 MG/DL (ref 8.5–10.5)
CHLORIDE BLD-SCNC: 103 MEQ/L (ref 98–111)
CO2: 26 MEQ/L (ref 23–33)
CREAT SERPL-MCNC: 0.7 MG/DL (ref 0.4–1.2)
GFR SERPL CREATININE-BSD FRML MDRD: > 90 ML/MIN/1.73M2
GLUCOSE BLD-MCNC: 71 MG/DL (ref 70–108)
POTASSIUM SERPL-SCNC: 4.3 MEQ/L (ref 3.5–5.2)
SODIUM BLD-SCNC: 142 MEQ/L (ref 135–145)
T4 FREE: 1.42 NG/DL (ref 0.93–1.76)
TOTAL PROTEIN: 7.9 G/DL (ref 6.1–8)
TSH SERPL DL<=0.05 MIU/L-ACNC: 1.28 UIU/ML (ref 0.4–4.2)

## 2021-03-11 PROCEDURE — 99214 OFFICE O/P EST MOD 30 MIN: CPT | Performed by: STUDENT IN AN ORGANIZED HEALTH CARE EDUCATION/TRAINING PROGRAM

## 2021-03-11 PROCEDURE — 90837 PSYTX W PT 60 MINUTES: CPT | Performed by: SOCIAL WORKER

## 2021-03-11 ASSESSMENT — ENCOUNTER SYMPTOMS
TROUBLE SWALLOWING: 0
DIARRHEA: 0
COUGH: 0
CONSTIPATION: 0
ABDOMINAL PAIN: 0
EYE PAIN: 0
BLOOD IN STOOL: 0
SHORTNESS OF BREATH: 0

## 2021-03-11 NOTE — PROGRESS NOTES
S: [age] Izquierdo y.o. female with   Chief Complaint   Patient presents with    Follow-up     6 week chronic conditions       HPI: depression/anxiety: counseling is working well. Mood is doing better. Down 3 days of the week. Declines medications    Pap resulted in colpo with PAMELA 2. LEEP scheduled. BP Readings from Last 3 Encounters:   03/11/21 112/64   02/11/21 118/76   01/07/21 104/70     Wt Readings from Last 3 Encounters:   03/11/21 187 lb 3.2 oz (84.9 kg)   02/11/21 186 lb 6.4 oz (84.6 kg)   01/07/21 186 lb (84.4 kg)           O: VS:  weight is 187 lb 3.2 oz (84.9 kg). Her skin temperature is 97.9 °F (36.6 °C). Her blood pressure is 112/64 and her pulse is 82. Her respiration is 16 and oxygen saturation is 100%. AAO/NAD, appropriate affect for mood     Diagnosis Orders   1. Slow transit constipation  Comprehensive Metabolic Panel   2. High grade squamous intraepithelial lesion (HGSIL), grade 2 PAMELA, on biopsy of cervix     3. Moderate episode of recurrent major depressive disorder (HCC)  TSH    T4, Free   4. Generalized anxiety disorder  TSH    T4, Free       Plan:  Check tsh. Continue counseling. Check cmp. Health Maintenance Due   Topic Date Due    Hepatitis C screen  Never done    Varicella vaccine (1 of 2 - 2-dose childhood series) Never done    HPV vaccine (1 - 2-dose series) Never done    Chlamydia screen  04/12/2015    Flu vaccine (1) Never done    DTaP/Tdap/Td vaccine (6 - Td) 10/18/2020         Attending Physician Statement  I have discussed the case, including pertinent history and exam findings with the resident. I also have seen the patient and performed key portions of the examination. I agree with the documented assessment and plan as documented by the resident.         Jacobo Olivarez DO 3/11/2021 8:59 AM

## 2021-03-11 NOTE — PROGRESS NOTES
Behavioral Health Consultation  DANIELLE Miles  3.11/2021  10:05 AM EST  This note will not be viewable in [email or web address] for the following reason(s). This is a Psychotherapy Note. Time spent with Patient: 53minutes  This is patient's second  [facility] appointment. Reason for Consult:    Chief Complaint   Patient presents with    Anxiety    Depression     Referring Provider: Hector Velasco DO  Pt provided informed consent for the behavioral health program. Discussed with patient model of service to include the limits of confidentiality (i.e. abuse reporting, suicide intervention, etc.) and short-term intervention focused approach. Pt indicated understanding. Feedback given to PCP. S:  Pt assessed that symptoms of anxiety and depression have slightly improved. She indicated changes evident of this as feeling more positive in her mood and less anxious. Pt processed how she is managing ongoing areas of distress. She was guided in discussing concepts of mindfulness, acceptance, and self forgiveness in decreasing negative thoughts about shortcomings in the past. Pt was advised of indications of depressive symptoms and instructed to monitor if symptoms worsen or interfere with daily functioning, to consider following-up with either your primary care team or a behavioral health provider for possible counseling or medication management. If suicidal thoughts are experienced, call 911. An additional 24/7 resource is the [employer] at [phone] ([phone]). Pt will attend a follow up appointment in four weeks.     O:  MSE:    Appearance    cooperative  Appetite normal  Sleep disturbance Yes  Fatigue Yes  Loss of pleasure Yes  Impulsive behavior No  Speech    normal volume  Mood    euthymic   Affect    normal affect  Thought Content    intact  Thought Process    coherent  Associations    logical connections  Insight    Fair  Judgment    Intact  Orientation    oriented to person, place, time, and general circumstances  Memory    recent and remote memory intact  Attention/Concentration    intact  Morbid ideation No  Suicide Assessment    no suicidal ideation    History:    TOBACCO:   reports that she has never smoked. She has never used smokeless tobacco.  ETOH:   reports current alcohol use. Family History:   Family History   Problem Relation Age of Onset    Diabetes Paternal Uncle     High Blood Pressure Mother     Cervical Cancer Mother     High Blood Pressure Father     Diabetes Maternal Grandmother     Diabetes Paternal Grandmother        A:       Diagnosis:    1. Generalized anxiety disorder    2. Mild episode of recurrent major depressive disorder ([facility] [address]          Diagnosis Date    Depression 2012    took meds for a week    Generalized anxiety disorder 3/10/2021    High grade squamous intraepithelial lesion (HGSIL), grade 2 PAMELA, on biopsy of cervix 3/10/2021    Moderate episode of recurrent major depressive disorder ([facility] [address] 3/10/2021    Postpartum hemorrhage 2014    just meds, no blood transfusion    Scoliosis 07/12/2012    20%    Trichomonas infection     11wks pregnant (2017)       Plan: Pt will attend a follow up appointment in four weeks  to assess symptoms and evaluate effectiveness of coping skills. Pt interventions:Provided handout on Constructive worry worksheet  Pt Behavioral Change Plan:   1. 1. Pt will utilize constructive worry worksheet. 2. Pt will practice self calming skills 2-3x's daily for 3-5 minutes. 3. Pt will promote effective self care habits daily/weekly-rest, relaxation, stress management, supportive relationships, increased physical outlets, engagement in enjoyable activities.   4. Pt will follow up with DANIELLE Hi

## 2021-03-11 NOTE — PATIENT INSTRUCTIONS
1.    Constructive Worry Worksheet  Concerns Solutions     1. ________________________                        2.  ______________________                        3.  _______________________     1. __________________________        2. __________________________        3. __________________________        1.  _________________________        2. __________________________        3. __________________________        1. __________________________        2. __________________________        3.  __________________________           Constructive Worry Instructions  When we have challenges, we tend to use our problem-solving skills to make our lives better and to relieve ourselves of anxiety. It is not surprising that some of us may use our problem-solving skills at the wrong time and place, namely bedtime. We may think about a problem, trying to solve it, but unfortunately this will oftentimes keep us awake. Constructive worry is a method for managing the tendency to worry during that quiet time when sleep is supposed to be taking over. Do this exercise early in the evening (at least 2 hours before bed). It should take only about 15 minutes to complete. Here is how it is done:  1. Write down the problem(s) facing you that has the greatest chance of keeping you awake a bedtime, and list them in the Concerns column of the [address]. 2. Then, think of the next step that might help fix it. Write it down in the Solutions column. This need not be the final solution to the problem, since most problems have to be solved by taking steps anyhow, and you will be doing this again tomorrow night and the night after until you finally get to the best solution.  If you know how to fix the problem completely, then write that down.  If you decide that this is not really a big problem, and you will just deal with it when the time comes, then write that down.    If you decide that you simply do not know what to do about it, and need to ask someone to help you, write that down.  If you decide that it is a problem, but there seems to be no good solution at all, and that you will just have to live with it, write that down, with a note to yourself that maybe sometime soon you or someone you speak with will give you a clue that will lead you to a solution. 3. Repeat this for any other concerns you have. 4. Fold the Constructive Worry Worksheet in half and place it on the nightstand next to your bed and forget about it until bedtime. 5. At bedtime, if you begin to worry actually tell yourself that you have dealt with your problems already in the best way you know how, and when you were at your problem-solving best.  Remind yourself that you will be working on them again tomorrow evening and that nothing you can do while you are so tired can help you any more than what you have already done; more effort will only make the matters worst.      Disputation:  Challenging Upsetting Thinking    Examine your thoughts for key words:  1. must, need, got to, have to, should (unrealistic standards)  2. never, always, completely, totally, all everything, everyone (predictions /  labeling)  3. awful, terrible, horrible, unbearable, disaster, worst ever (labeling / predictions)  4. jerk, slob, creep, hypocrite, bully, stupid (labels)  Dispute or question the accuracy of the questionable thoughts. 1. Am I upsetting myself unnecessarily? How can I see this another way? 2. Is my thinking working for or against me? How could I view this in a less upsetting way? 3. What am I demanding must happen? What do I want or prefer, rather than need? 4. Am I making something too terrible? Is it really that awful? What would be so terrible about that? 5. Am I labeling a person? What is the action that I dont like? 6. Whats untrue about my thoughts? How can I stick to the facts?   Whats the proof for what I am thinking or believing about this? 7. Am I using extreme, black-and-white language? What less extreme words might be more accurate? 8. Am I fortune telling or mind reading in a way that gets me upset or unhappy? What are the odds (percent chance -- e.g., there is a 5% chance. ..) that it will really turn out the way Im thinking or imagining? 9. What are my options in this situation? How would I like to respond? 10. Create more moderate, helpful, or realistic statements to replace the upsetting ones. 11. Have I had any experiences that show that this thought might not be totally true? 12. If my best friend or someone I loved had this thought, what would I tell them? 15. If my best friend or someone I loved knew I was thinking this thought, what would they say to me? What evidence would they point out to me that would suggest that my thought is not completely true? 14. Are there strengths in me or positives in the situation that I am ignoring? Am I underestimating my ability to cope with unfortunate circumstances? 15. When I am not feeling this way, do I think about this situation any differently? How?  16. Have I been in this type of situation before? What happened? What have I learned from prior experiences that could help me now? 17. Five years from now, if I look back on this situation, will I look at it any differently? Will I focus on any different part of my experience? 25. Am I blaming myself for something over which I do not have complete control? 4. Pt will familiarize self with [facility]. 5. Pt will prioritize self care daily/weekly- relaxation, stress management, increased physical outlet, enjoyable activities, supportive relationship.   6. Pt will follow up with DANIELLE Henriquez

## 2021-03-11 NOTE — PROGRESS NOTES
Health Maintenance Due   Topic Date Due    Hepatitis C screen  Never done    Varicella vaccine (1 of 2 - 2-dose childhood series) Never done    HPV vaccine (1 - 2-dose series) Never done    Chlamydia screen  04/12/2015    Flu vaccine (1) Never done    DTaP/Tdap/Td vaccine (6 - Td) 10/18/2020

## 2021-03-11 NOTE — PROGRESS NOTES
Mee Orosco is a 27 y.o. female who presents today for:  Chief Complaint   Patient presents with    Follow-up     6 week chronic conditions         HPI:   44-year-old female with history of depression, PAMELA 2 who presents for follow up after seeing counseling for depression and generalized anxiety. She was not interested in medication prior. Reports feeling cold, tired, weight fluctuating. Anemia in the past, resolved. Mood improving with counseling. Episodes of zoning out while at work, feels more bored. No episodes of zoning out while at home. No chest pain, SOB, syncope. 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 Has LEEP scheduled with Dr. Talia Wong at end of March. Is hoping to switch to first shift at work and then could have more time with kids. Objective:     Vitals:    03/11/21 0825   BP: 112/64   Pulse: 82   Resp: 16   Temp: 97.9 °F (36.6 °C)   SpO2: 100%       Review of Systems   Constitutional: Negative for chills, fatigue and fever. HENT: Negative for congestion, ear pain, postnasal drip and trouble swallowing. Eyes: Negative for pain and visual disturbance. Respiratory: Negative for cough and shortness of breath. Cardiovascular: Negative for chest pain and palpitations. Gastrointestinal: Negative for abdominal pain, blood in stool, constipation and diarrhea. Endocrine: Positive for cold intolerance. Genitourinary: Negative for dysuria and hematuria. Skin: Negative for rash and wound. Neurological: Negative for dizziness and headaches. Psychiatric/Behavioral: Negative for suicidal ideas. The patient is not nervous/anxious. Physical Exam  Vitals signs and nursing note reviewed. Constitutional:       General: She is not in acute distress. Appearance: She is well-developed. She is not ill-appearing or diaphoretic. HENT:      Head: Normocephalic and atraumatic.       Right Ear: External ear normal.      Left Ear: External ear normal.      Nose: Nose normal.   Eyes: General: No scleral icterus. Right eye: No discharge. Left eye: No discharge. Conjunctiva/sclera: Conjunctivae normal.   Neck:      Musculoskeletal: Normal range of motion. Cardiovascular:      Rate and Rhythm: Normal rate and regular rhythm. Heart sounds: Normal heart sounds. No murmur. Pulmonary:      Effort: Pulmonary effort is normal.      Breath sounds: Normal breath sounds. Skin:     General: Skin is warm and dry. Findings: No erythema or rash. Neurological:      Mental Status: She is alert and oriented to person, place, and time. Gait: Gait normal.   Psychiatric:         Behavior: Behavior normal.         Thought Content: Thought content normal.         Judgment: Judgment normal.         Assessment / Plan:   Laney Head was seen today for follow-up. Diagnoses and all orders for this visit:    Moderate episode of recurrent major depressive disorder (HCC)  Generalized anxiety disorder  -     TSH; Future  -     T4, Free; Future  -     Comprehensive Metabolic Panel; Future  Doing well with counseling. No SI/HI. Screen for thyroid disorder with thyroid function test.  CMP, as last one was in 2014. High grade squamous intraepithelial lesion (HGSIL), grade 2 PAMELA, on biopsy of cervix  Follow up as scheduled with OBGYN       Social Needs   Food insecurity    Worry: Never true    Inability: Never true            Return in about 6 weeks (around 4/22/2021) for f/u labs and depression. Medications Prescribed:  No orders of the defined types were placed in this encounter. Future Appointments   Date Time Provider Andrea Jama   3/11/2021 10:00 AM [facility] [place]   4/22/2021  1:20 PM Natalia Hoang DO [facility] - [address] [facility]       Patient given educational materials - see patient instructions. Discussed use, benefit, and sideeffects of prescribed medications. All patient questions answered. Pt voiced understanding. Reviewed health maintenance. Instructed to continue current medications, diet and exercise. Patient agreed with treatment plan. Follow up as directed.      Electronically signed by Satnam Chamorro DO on 3/11/2021 at 9:09 AM

## 2021-03-22 ENCOUNTER — NURSE ONLY (OUTPATIENT)
Dept: LAB | Age: 26
End: 2021-03-22

## 2021-04-30 ENCOUNTER — OFFICE VISIT (OUTPATIENT)
Dept: FAMILY MEDICINE CLINIC | Age: 26
End: 2021-04-30
Payer: COMMERCIAL

## 2021-04-30 VITALS
WEIGHT: 187.6 LBS | BODY MASS INDEX: 29.44 KG/M2 | SYSTOLIC BLOOD PRESSURE: 106 MMHG | TEMPERATURE: 98.2 F | OXYGEN SATURATION: 98 % | DIASTOLIC BLOOD PRESSURE: 64 MMHG | HEART RATE: 72 BPM | RESPIRATION RATE: 16 BRPM | HEIGHT: 67 IN

## 2021-04-30 DIAGNOSIS — F41.1 GENERALIZED ANXIETY DISORDER: ICD-10-CM

## 2021-04-30 DIAGNOSIS — F33.1 MODERATE EPISODE OF RECURRENT MAJOR DEPRESSIVE DISORDER (HCC): ICD-10-CM

## 2021-04-30 DIAGNOSIS — N87.1 HIGH GRADE SQUAMOUS INTRAEPITHELIAL LESION (HGSIL), GRADE 2 CIN, ON BIOPSY OF CERVIX: Primary | ICD-10-CM

## 2021-04-30 PROCEDURE — 90471 IMMUNIZATION ADMIN: CPT | Performed by: FAMILY MEDICINE

## 2021-04-30 PROCEDURE — 90651 9VHPV VACCINE 2/3 DOSE IM: CPT | Performed by: FAMILY MEDICINE

## 2021-04-30 PROCEDURE — 99213 OFFICE O/P EST LOW 20 MIN: CPT | Performed by: STUDENT IN AN ORGANIZED HEALTH CARE EDUCATION/TRAINING PROGRAM

## 2021-04-30 ASSESSMENT — ENCOUNTER SYMPTOMS
SHORTNESS OF BREATH: 0
BLOOD IN STOOL: 0
CONSTIPATION: 0
ABDOMINAL PAIN: 0
EYE PAIN: 0
TROUBLE SWALLOWING: 0
COUGH: 0
DIARRHEA: 0

## 2021-04-30 NOTE — PROGRESS NOTES
S: 27 y.o. female with   Chief Complaint   Patient presents with    Follow-up     Pt presents for a 6 week f/u. Pt states she has been doing good. HPI: depression:  Doing well. No meds. Was seeing therapist.  This was helping. No issues currently. BP Readings from Last 3 Encounters:   04/30/21 106/64   03/11/21 112/64   02/11/21 118/76     Wt Readings from Last 3 Encounters:   04/30/21 187 lb 9.6 oz (85.1 kg)   03/11/21 187 lb 3.2 oz (84.9 kg)   02/11/21 186 lb 6.4 oz (84.6 kg)           O: VS:  height is 5' 7\" (1.702 m) and weight is 187 lb 9.6 oz (85.1 kg). Her temperature is 98.2 °F (36.8 °C). Her blood pressure is 106/64 and her pulse is 72. Her respiration is 16 and oxygen saturation is 98%. Diagnosis Orders   1. High grade squamous intraepithelial lesion (HGSIL), grade 2 PAMELA, on biopsy of cervix  HPV Vaccine 9-valent IM   2. Moderate episode of recurrent major depressive disorder ([facility] [address]     3. Generalized anxiety disorder         Plan:  gardisil today. rtc 1 month for nurse for second gardisil. 3rd at next doc visit. Depression controlled. Health Maintenance Due   Topic Date Due    Hepatitis C screen  Never done    HPV vaccine (1 - 2-dose series) Never done    COVID-19 Vaccine (1) Never done    Chlamydia screen  04/12/2015    DTaP/Tdap/Td vaccine (6 - Td) 10/18/2020         Attending Physician Statement  I have discussed the case, including pertinent history and exam findings with the resident. I agree with the documented assessment and plan as documented by the resident.         Gonzales Lott DO 4/30/2021 10:50 AM

## 2021-04-30 NOTE — PROGRESS NOTES
Fredy Siddiqi is a 27 y.o. female who presents today for:  Chief Complaint   Patient presents with    Follow-up     Pt presents for a 6 week f/u. Pt states she has been doing good. HPI:   72-year-old female with history of high-grade squamous intraepithelial lesion, grade 2 PAMELA status post LEEP with good margins and depression and anxiety who is here to follow-up on that after starting counseling. TSH/T4 wnl. CMP wnl. Doing well, no new complaints. Pt is here for depression. Pt currently not taking medication. Side effects noted: none    Sleep - broken due to schedule   Interest- no hobbies at this time. Guilt- still present when unable to spend time with oldest daughter  Energy- \"improving, still not motivated to do a whole lot, but doing more than before\"  Concentration- OK  Appetite- OK  Psychomotor- NO  Suicidal/ Homicidal Ideations- NO  Anxiety-OK  Libido- OK  Employer? Lost work days? - 1 day to spend time with daughter        Objective:     Vitals:    04/30/21 1037   BP: 106/64   Pulse: 72   Resp: 16   Temp: 98.2 °F (36.8 °C)   SpO2: 98%   Weight: 187 lb 9.6 oz (85.1 kg)   Height: 5' 7\" (1.702 m)       Lab Results   Component Value Date    WBC 5.9 01/07/2021    HGB 14.1 01/07/2021    HCT 43.1 01/07/2021    MCV 89.0 01/07/2021     01/07/2021     Lab Results   Component Value Date     03/11/2021    K 4.3 03/11/2021     03/11/2021    CO2 26 03/11/2021    BUN 8 03/11/2021    CREATININE 0.7 03/11/2021    GLUCOSE 71 03/11/2021    CALCIUM 9.5 03/11/2021    PROT 7.9 03/11/2021    LABALBU 4.5 03/11/2021    BILITOT 0.4 03/11/2021    ALKPHOS 61 03/11/2021    AST 18 03/11/2021    ALT 18 03/11/2021    LABGLOM >90 03/11/2021     Lab Results   Component Value Date    TSH 1.280 03/11/2021     Review of Systems   Constitutional: Negative for chills, fatigue and fever. HENT: Negative for congestion, ear pain, postnasal drip and trouble swallowing.     Eyes: Negative for pain and visual disturbance. Respiratory: Negative for cough and shortness of breath. Cardiovascular: Negative for chest pain and palpitations. Gastrointestinal: Negative for abdominal pain, blood in stool, constipation and diarrhea. Genitourinary: Negative for dysuria and hematuria. Skin: Negative for rash and wound. Neurological: Negative for dizziness and headaches. Psychiatric/Behavioral: The patient is not nervous/anxious. Physical Exam  Vitals signs and nursing note reviewed. Constitutional:       General: She is not in acute distress. Appearance: She is well-developed. She is not diaphoretic. HENT:      Head: Normocephalic and atraumatic. Right Ear: External ear normal.      Left Ear: External ear normal.      Nose: Nose normal.   Eyes:      General: No scleral icterus. Right eye: No discharge. Left eye: No discharge. Conjunctiva/sclera: Conjunctivae normal.   Neck:      Musculoskeletal: Normal range of motion. Cardiovascular:      Rate and Rhythm: Normal rate and regular rhythm. Heart sounds: Normal heart sounds. No murmur. Pulmonary:      Effort: Pulmonary effort is normal.      Breath sounds: Normal breath sounds. Abdominal:      Palpations: Abdomen is soft. Tenderness: There is no abdominal tenderness. Skin:     General: Skin is warm and dry. Findings: No erythema or rash. Neurological:      Mental Status: She is alert and oriented to person, place, and time. Psychiatric:         Behavior: Behavior normal.         Thought Content: Thought content normal.         Judgment: Judgment normal.         Social Needs   Food insecurity    Worry: Never true    Inability: Never true       Assessment / Plan:      Diagnosis Orders   1. High grade squamous intraepithelial lesion (HGSIL), grade 2 PAMELA, on biopsy of cervix  HPV Vaccine 9-valent IM   2. Moderate episode of recurrent major depressive disorder ([facility] [address]     3.  Generalized anxiety disorder Gardasil needed - today, 1 month, and 6 month shots. Return for nurse visits for Gardasil. Tdap during last pregnancy     Return in 6 months  Labs in 1 year prior to next appointment: CBC, CMP, hep C    Preventative Medicine   HIV/Hep C: HIV negative, Hep C ?   Pap/Pelvic: Per Dr. Monika Thacker    Specialists:   Diogo Weaver      Return in about 4 weeks (around 5/28/2021) for Nurse visit for 2nd Gardasil dose; then follow up with me in 6-8 months for wellness exam .          Medications Prescribed:  No orders of the defined types were placed in this encounter. Future Appointments   Date Time Provider Andrea Jama   6/1/2021 11:30 AM SCHEDULE, [facility] RES CLINIC LAB/MA VISIT [facility] [place]   11/4/2021  1:00 PM Marisa Marquis DO [facility] - [address] [facility]       Patient given educational materials - see patient instructions. Discussed use, benefit, and sideeffects of prescribed medications. All patient questions answered. Pt voiced understanding. Reviewed health maintenance. Instructed to continue current medications, diet and exercise. Patient agreed with treatment plan. Follow up as directed.      Electronically signed by Sanam Rivera DO on 4/30/2021 at 11:07 AM

## 2021-04-30 NOTE — PATIENT INSTRUCTIONS
illnesses, such as a cold, may be vaccinated. People who are moderately or severely ill should usually wait until they recover before getting HPV vaccine. Your health care provider can give you more information. Risks of a vaccine reaction  · Soreness, redness, or swelling where the shot is given can happen after HPV vaccine. · Fever or headache can happen after HPV vaccine. People sometimes faint after medical procedures, including vaccination. Tell your provider if you feel dizzy or have vision changes or ringing in the ears. As with any medicine, there is a very remote chance of a vaccine causing a severe allergic reaction, other serious injury, or death. What if there is a serious problem? An allergic reaction could occur after the vaccinated person leaves the clinic. If you see signs of a severe allergic reaction (hives, swelling of the face and throat, difficulty breathing, a fast heartbeat, dizziness, or weakness), call 9-1-1 and get the person to the nearest hospital.  For other signs that concern you, call your health care provider. Adverse reactions should be reported to the Vaccine Adverse Event Reporting System (VAERS). Your health care provider will usually file this report, or you can do it yourself. Visit the VAERS website at www.vaers. hhs.gov or call 9-831.859.4053. VAERS is only for reporting reactions, and VAERS staff do not give medical advice. The National Vaccine Injury Compensation Program  The National Vaccine Injury Compensation Program (VICP) is a federal program that was created to compensate people who may have been injured by certain vaccines. Visit the VICP website at www.hrsa.gov/vaccinecompensation or call 0-269.355.9643 to learn about the program and about filing a claim. There is a time limit to file a claim for compensation. How can I learn more? · Ask your health care provider. · Call your local or state health department.   · Contact the Centers for Disease Control and Prevention (CDC):  ? Call 3-956.335.8170 (1-800-CDC-INFO) or  ? Visit CDC's website at www.cdc.gov/vaccines  Vaccine Information Statement (Interim)  HPV Vaccine  10/30/2019  [address] [facility] 554BX-90  Department of Health and Human Services  Centers for Disease Control and Prevention  Many Vaccine Information Statements are available in Russian and other languages. See www.immunize.org/vis. Hojas de Información Sobre Vacunas están disponibles en español y en muchos otros idiomas. Visite Nathan.si. Care instructions adapted under license by [facility] ([facility]). If you have questions about a medical condition or this instruction, always ask your healthcare professional. [address] any warranty or liability for your use of this information.

## 2021-06-03 ENCOUNTER — NURSE ONLY (OUTPATIENT)
Dept: FAMILY MEDICINE CLINIC | Age: 26
End: 2021-06-03
Payer: COMMERCIAL

## 2021-06-03 DIAGNOSIS — Z23 NEED FOR HPV VACCINATION: Primary | ICD-10-CM

## 2021-06-03 PROCEDURE — 90471 IMMUNIZATION ADMIN: CPT | Performed by: FAMILY MEDICINE

## 2021-06-03 PROCEDURE — 90651 9VHPV VACCINE 2/3 DOSE IM: CPT | Performed by: FAMILY MEDICINE

## 2021-06-03 NOTE — PATIENT INSTRUCTIONS
recommended. Routine vaccination  · This HPV vaccine is recommended for girls and boys 6or 15years of age. It may be given starting at age 5. Why is HPV vaccine recommended at 6or 15years of age? HPV infection is easily acquired, even with only one sex partner. That is why it is important to get HPV vaccine before any sexual contact takes place. Also, response to the vaccine is better at this age than at older ages. Catch-up vaccination  This vaccine is recommended for the following people who have not completed the 3-dose series:  · Females 15 through 32years of age  · Males 15 through 24years of age  This vaccine may be given to men 25 through 32years of age who have not completed the 3-dose series. It is recommended for men through age 32 who have sex with men or whose immune system is weakened because of HIV infection, other illness, or medications. HPV vaccine may be given at the same time as other vaccines. Some people should not get HPV vaccine or should wait  · Anyone who has ever had a life-threatening allergic reaction to any component of HPV vaccine, or to a previous dose of HPV vaccine, should not get the vaccine. Tell your doctor if the person getting vaccinated has any severe allergies, including an allergy to yeast.  · HPV vaccine is not recommended for pregnant women. However, receiving HPV vaccine when pregnant is not a reason to consider terminating the pregnancy. Women who are breast feeding may get the vaccine. · People who are mildly ill when a dose of HPV vaccine is planned can still be vaccinated. People with a moderate or severe illness should wait until they are better. What are the risks from this vaccine? This HPV vaccine has been used in the [place] and around the world for about six years and has been very safe. However, any medicine could possibly cause a serious problem, such as a severe allergic reaction.  The risk of any vaccine causing a serious injury, or death, is advice. The National Vaccine Injury Compensation Program  The National Vaccine Injury Compensation Program (VICP) is a federal program that was created to compensate people who may have been injured by certain vaccines. Persons who believe they may have been injured by a vaccine can learn about the program and about filing a claim by calling 9-498.972.6342 or visiting the [employer] website at www.[facility].gov/vaccinecompensation. How can I learn more? · Ask your doctor. · Call your local or state health department. · Contact the Centers for Disease Control and Prevention (CDC):  ? Call 8-563.934.1256 (1-800-CDC-INFO) or  ? Visit the CDC's website at www.cdc.gov/vaccines. Vaccine Information Statement (Interim)  HPV Vaccine (Gardasil)  (5/17/2013)  42 DONALD Brown 369QN-79  Department of Health and Human Services  Centers for Disease Control and Prevention  Many Vaccine Information Statements are available in Uzbek and other languages. See www.immunize.org/vis. Muchas hojas de información sobre vacunas están disponibles en español y en otros idiomas. Visite www.immunize.org/vis. Care instructions adapted under license by [facility] ([facility]). If you have questions about a medical condition or this instruction, always ask your healthcare professional. [address] any warranty or liability for your use of this information.

## 2021-06-03 NOTE — PROGRESS NOTES
Immunizations Administered     Name Date Dose Route    HPV 9-valent Snushine Hahn) 6/3/2021 0.5 mL Intramuscular    Site: Deltoid- Right    Lot: [id number]    NDC: 3933-6335-28        For Pharmacy Admin Tracking Only     Intervention Detail: Vaccine Recommended/Administered   Total # of Interventions Recommended: 1   Total # of Interventions Accepted: 1   Time Spent (min): 10      Electronically signed by Gilford Schilling, [address] [place] [address] on 6/3/2021 at 11:54 AM

## 2021-08-04 NOTE — PROGRESS NOTES
Chantelle Joe is a 27 y.o. female who presents today for:  Chief Complaint   Patient presents with    Hematochezia     Pt presents c/o continuing hematochezia (she notes this mostly when she has diarrhea). She has also been nauseous especially when she lays down. HPI:   Chantelle Joe is 27 y. o. who presents today for evaluation of hematochezia. She has been having bright red blood per rectum since January 2021. No associated pain. She has not identified any triggers. She does have episodes of diarrhea. She has a little pain in the right lower quadrant. She has associated nausea when she eats that worsens when she lays down. She denies metalic taste in her mouth, reflux symptoms. No foods make it worse. Nothing makes it better. No pain with BM's. No family hx of IBD or colon cancer. Would like to see GI.        Objective:     Vitals:    08/05/21 1009   BP: 118/72   Pulse: 66   Resp: 14   Temp: 98.4 °F (36.9 °C)   SpO2: 99%   Weight: 188 lb 6.4 oz (85.5 kg)   Height: 5' 7\" (1.702 m)       Wt Readings from Last 3 Encounters:   08/05/21 188 lb 6.4 oz (85.5 kg)   04/30/21 187 lb 9.6 oz (85.1 kg)   03/11/21 187 lb 3.2 oz (84.9 kg)       BP Readings from Last 3 Encounters:   08/05/21 118/72   04/30/21 106/64   03/11/21 112/64       Lab Results   Component Value Date    WBC 5.9 01/07/2021    HGB 14.1 01/07/2021    HCT 43.1 01/07/2021    MCV 89.0 01/07/2021     01/07/2021     Lab Results   Component Value Date     03/11/2021    K 4.3 03/11/2021     03/11/2021    CO2 26 03/11/2021    BUN 8 03/11/2021    CREATININE 0.7 03/11/2021    GLUCOSE 71 03/11/2021    CALCIUM 9.5 03/11/2021    PROT 7.9 03/11/2021    LABALBU 4.5 03/11/2021    BILITOT 0.4 03/11/2021    ALKPHOS 61 03/11/2021    AST 18 03/11/2021    ALT 18 03/11/2021    LABGLOM >90 03/11/2021     Lab Results   Component Value Date    TSH 1.280 03/11/2021    T4FREE 1.42 03/11/2021     No results found for: LABA1C  No results found for: EAG  No results found for: CHOL  No results found for: TRIG  No results found for: HDL  No results found for: LDLCHOLESTEROL, LDLCALC    No results found for: LABMICR, [id number]    Review of Systems   Constitutional: Positive for fatigue. Negative for chills and fever. HENT: Negative for congestion, ear pain, postnasal drip and trouble swallowing. Eyes: Negative for pain and visual disturbance. Respiratory: Negative for cough and shortness of breath. Cardiovascular: Negative for chest pain and palpitations. Gastrointestinal: Positive for abdominal pain, anal bleeding, blood in stool and nausea. Negative for constipation and diarrhea. Genitourinary: Negative for dysuria and hematuria. Skin: Negative for rash and wound. Neurological: Negative for dizziness and headaches. Psychiatric/Behavioral: The patient is not nervous/anxious. Physical Exam  Vitals and nursing note reviewed. Constitutional:       General: She is not in acute distress. Appearance: She is well-developed. She is not diaphoretic. HENT:      Head: Normocephalic and atraumatic. Right Ear: External ear normal.      Left Ear: External ear normal.      Nose: Nose normal.   Eyes:      General: No scleral icterus. Right eye: No discharge. Left eye: No discharge. Conjunctiva/sclera: Conjunctivae normal.   Cardiovascular:      Rate and Rhythm: Normal rate and regular rhythm. Heart sounds: Normal heart sounds. No murmur heard. Pulmonary:      Effort: Pulmonary effort is normal.      Breath sounds: Normal breath sounds. Abdominal:      General: Abdomen is flat. Bowel sounds are normal. There is no distension. Palpations: Abdomen is soft. There is no mass. Tenderness: There is abdominal tenderness (mild TTP in RLQ). There is no guarding or rebound. Hernia: No hernia is present. Musculoskeletal:      Cervical back: Normal range of motion.    Skin:     General: Skin is warm and dry.      Coloration: Skin is not jaundiced. Findings: No erythema or rash. Neurological:      Mental Status: She is alert and oriented to person, place, and time. Psychiatric:         Mood and Affect: Mood normal.         Behavior: Behavior normal.         Thought Content: Thought content normal.         Judgment: Judgment normal.         Immunization History   Administered Date(s) Administered    DTaP 01/21/1998, 06/12/2000, 07/05/2001, 08/14/2002, 10/18/2010    HPV 9-valent Driss Morning) 04/30/2021, 06/03/2021    Hepatitis B 1997, 01/21/1998, 06/12/2000    Hib, unspecified 01/21/1998, 06/12/2000    MMR 06/12/2000, 07/05/2001    Meningococcal MCV4P (Menactra) 08/01/2013    Polio IPV (IPOL) 01/21/1998, 06/12/2000, 07/05/2001    Tdap (Boostrix, Adacel) 10/18/2010       Health Maintenance Due   Topic Date Due    Hepatitis C screen  Never done    COVID-19 Vaccine (1) Never done    Chlamydia screen  04/12/2015    DTaP/Tdap/Td vaccine (6 - Td or Tdap) 10/18/2020          Food Insecurity: No Food Insecurity    Worried About Running Out of Food in the Last Year: Never true    Saira of Food in the Last Year: Never true       Assessment / Plan:      Diagnosis Orders   1. Hematochezia  NATALI Feng MD, Gastroenterology, [facility]     Vital signs stable. Previous labs stable. Refer to GI.   F/u as scheduled in November     Medications Prescribed:  No orders of the defined types were placed in this encounter. Future Appointments   Date Time Provider Andrea Jama   11/4/2021  1:00 PM Marisa Marquis,  [address]       Patient given educational materials - see patient instructions. Discussed use, benefit, and sideeffects of prescribed medications. All patient questions answered. Pt voiced understanding. Reviewed health maintenance. Instructed to continue current medications, diet and exercise. Patient agreed with treatment plan. Follow up as directed. Electronically signed by Chun Beltran DO on 8/5/2021 at 10:36 AM

## 2021-08-05 ENCOUNTER — OFFICE VISIT (OUTPATIENT)
Dept: FAMILY MEDICINE CLINIC | Age: 26
End: 2021-08-05
Payer: COMMERCIAL

## 2021-08-05 VITALS
BODY MASS INDEX: 29.57 KG/M2 | OXYGEN SATURATION: 99 % | HEART RATE: 66 BPM | RESPIRATION RATE: 14 BRPM | DIASTOLIC BLOOD PRESSURE: 72 MMHG | WEIGHT: 188.4 LBS | SYSTOLIC BLOOD PRESSURE: 118 MMHG | TEMPERATURE: 98.4 F | HEIGHT: 67 IN

## 2021-08-05 DIAGNOSIS — K92.1 HEMATOCHEZIA: Primary | ICD-10-CM

## 2021-08-05 PROCEDURE — 99213 OFFICE O/P EST LOW 20 MIN: CPT | Performed by: STUDENT IN AN ORGANIZED HEALTH CARE EDUCATION/TRAINING PROGRAM

## 2021-08-05 ASSESSMENT — ENCOUNTER SYMPTOMS
TROUBLE SWALLOWING: 0
ABDOMINAL PAIN: 1
CONSTIPATION: 0
DIARRHEA: 0
BLOOD IN STOOL: 1
EYE PAIN: 0
SHORTNESS OF BREATH: 0
COUGH: 0
ANAL BLEEDING: 1
NAUSEA: 1

## 2021-08-05 NOTE — PROGRESS NOTES
S: 27 y.o. female with   Chief Complaint   Patient presents with    Hematochezia     Pt presents c/o continuing hematochezia (she notes this mostly when she has diarrhea). She has also been nauseous especially when she lays down. Bleeding from the rectum once a week since jan - wanted a referral to gi - no [facility] inflammatory disease - no change in size     BP Readings from Last 3 Encounters:   08/05/21 118/72   04/30/21 106/64   03/11/21 112/64     Wt Readings from Last 3 Encounters:   08/05/21 188 lb 6.4 oz (85.5 kg)   04/30/21 187 lb 9.6 oz (85.1 kg)   03/11/21 187 lb 3.2 oz (84.9 kg)           O: VS:   Vitals:    08/05/21 1009   BP: 118/72   Pulse: 66   Resp: 14   Temp: 98.4 °F (36.9 °C)   SpO2: 99%   Weight: 188 lb 6.4 oz (85.5 kg)   Height: 5' 7\" (1.702 m)     Body mass index is 29.51 kg/m². AAO/NAD, appropriate affect for mood  Normocephalic, atraumatic, eyes  conjunctiva and sclera normal,   skin no rashes on exposed areas   Insight, judgement normal and in no acute distress      Lab Results   Component Value Date    WBC 5.9 01/07/2021    HGB 14.1 01/07/2021    HCT 43.1 01/07/2021     01/07/2021    AST 18 03/11/2021     03/11/2021    K 4.3 03/11/2021     03/11/2021    CREATININE 0.7 03/11/2021    BUN 8 03/11/2021    CO2 26 03/11/2021    TSH 1.280 03/11/2021    LABGLOM >90 03/11/2021    CALCIUM 9.5 03/11/2021       No results found. Diagnosis Orders   1. Hematochezia  AFL - Di Washington MD, Gastroenterology, [facility] to refer to gi    If not better can come back - we can look or send to surgery         No follow-ups on file. Orders Placed:  Orders Placed This Encounter   Procedures   Nedra Harmon MD, Gastroenterology, [facility]     Medications Prescribed:  No orders of the defined types were placed in this encounter.       Future Appointments   Date Time Provider Andrea Jama   11/4/2021  1:00 PM Diana Baker [address]

## 2021-10-08 ENCOUNTER — HOSPITAL ENCOUNTER (OUTPATIENT)
Dept: ULTRASOUND IMAGING | Age: 26
Discharge: HOME | End: 2021-10-08
Payer: COMMERCIAL

## 2021-10-08 DIAGNOSIS — R10.32 ABDOMINAL PAIN, LEFT LOWER QUADRANT: ICD-10-CM

## 2021-10-08 PROCEDURE — 76830 TRANSVAGINAL US NON-OB: CPT

## 2021-11-11 ENCOUNTER — OFFICE VISIT (OUTPATIENT)
Dept: FAMILY MEDICINE CLINIC | Age: 26
End: 2021-11-11
Payer: COMMERCIAL

## 2021-11-11 VITALS
TEMPERATURE: 97.3 F | OXYGEN SATURATION: 99 % | BODY MASS INDEX: 29.51 KG/M2 | SYSTOLIC BLOOD PRESSURE: 112 MMHG | WEIGHT: 188 LBS | HEIGHT: 67 IN | HEART RATE: 87 BPM | RESPIRATION RATE: 16 BRPM | DIASTOLIC BLOOD PRESSURE: 74 MMHG

## 2021-11-11 DIAGNOSIS — B96.89 BACTERIAL VAGINOSIS: ICD-10-CM

## 2021-11-11 DIAGNOSIS — Z00.00 WELLNESS EXAMINATION: Primary | ICD-10-CM

## 2021-11-11 DIAGNOSIS — F32.5 MAJOR DEPRESSIVE DISORDER WITH SINGLE EPISODE, IN REMISSION (HCC): ICD-10-CM

## 2021-11-11 DIAGNOSIS — R35.0 URINARY FREQUENCY: ICD-10-CM

## 2021-11-11 DIAGNOSIS — N76.0 BACTERIAL VAGINOSIS: ICD-10-CM

## 2021-11-11 DIAGNOSIS — Z23 NEED FOR HPV VACCINATION: ICD-10-CM

## 2021-11-11 LAB
BILIRUBIN URINE: NEGATIVE
BLOOD URINE, POC: NEGATIVE
CHARACTER, URINE: CLEAR
COLOR, URINE: YELLOW
GLUCOSE URINE: NEGATIVE MG/DL
KETONES, URINE: NEGATIVE
LEUKOCYTE CLUMPS, URINE: NEGATIVE
NITRITE, URINE: NEGATIVE
PH, URINE: 8.5 (ref 5–9)
PROTEIN, URINE: NEGATIVE MG/DL
SPECIFIC GRAVITY, URINE: 1.02 (ref 1–1.03)
UROBILINOGEN, URINE: 2 EU/DL (ref 0–1)

## 2021-11-11 PROCEDURE — 99385 PREV VISIT NEW AGE 18-39: CPT | Performed by: STUDENT IN AN ORGANIZED HEALTH CARE EDUCATION/TRAINING PROGRAM

## 2021-11-11 PROCEDURE — 81003 URINALYSIS AUTO W/O SCOPE: CPT | Performed by: STUDENT IN AN ORGANIZED HEALTH CARE EDUCATION/TRAINING PROGRAM

## 2021-11-11 PROCEDURE — 90651 9VHPV VACCINE 2/3 DOSE IM: CPT | Performed by: FAMILY MEDICINE

## 2021-11-11 PROCEDURE — 90471 IMMUNIZATION ADMIN: CPT | Performed by: FAMILY MEDICINE

## 2021-11-11 RX ORDER — SULFAMETHOXAZOLE AND TRIMETHOPRIM 800; 160 MG/1; MG/1
1 TABLET ORAL 2 TIMES DAILY
Qty: 6 TABLET | Refills: 0 | Status: CANCELLED | OUTPATIENT
Start: 2021-11-11 | End: 2021-11-14

## 2021-11-11 RX ORDER — METRONIDAZOLE 500 MG/1
500 TABLET ORAL 2 TIMES DAILY
Qty: 14 TABLET | Refills: 0 | Status: SHIPPED | OUTPATIENT
Start: 2021-11-11 | End: 2021-11-18

## 2021-11-11 ASSESSMENT — ENCOUNTER SYMPTOMS
ABDOMINAL PAIN: 0
SHORTNESS OF BREATH: 0
CONSTIPATION: 0
TROUBLE SWALLOWING: 0
EYE PAIN: 0
BLOOD IN STOOL: 0
COUGH: 0
DIARRHEA: 0

## 2021-11-11 NOTE — PROGRESS NOTES
Health Maintenance Due   Topic Date Due    Hepatitis C screen  Never done    COVID-19 Vaccine (1) Never done Declined    Chlamydia screen  04/12/2015    Flu vaccine (1) Never done Declined    HPV vaccine (3 - 3-dose series) 10/30/2021

## 2021-11-11 NOTE — PROGRESS NOTES
Immunizations Administered     Name Date Dose Route    HPV 9-valent Daryl Back) 11/11/2021 0.5 mL Intramuscular    Site: Deltoid- Left    Lot: [id number]    NDC: 0741-1097-35        For Pharmacy [address] [place] [address] Road in place:   Yes   Recommendation Provided To: Provider: 1 via Verbally to provider   Intervention Detail: Vaccine Recommended/Administered   Gap Closed?: No    Intervention Accepted By: Provider: 1   Time Spent (min): Ananda Romero PharmD   11/11/2021, 4:01 PM

## 2021-11-11 NOTE — PROGRESS NOTES
S: 27 y.o. female with   Chief Complaint   Patient presents with    Annual Exam     Wellness and 3rd HPV       Wellness    Also noted the fishy odor is back with the BV  Had increased frequency of urination    BP Readings from Last 3 Encounters:   11/11/21 112/74   08/05/21 118/72   04/30/21 106/64     Wt Readings from Last 3 Encounters:   11/11/21 188 lb (85.3 kg)   08/05/21 188 lb 6.4 oz (85.5 kg)   04/30/21 187 lb 9.6 oz (85.1 kg)           O: VS:   Vitals:    11/11/21 1245   BP: 112/74   Site: Left Upper Arm   Position: Sitting   Cuff Size: Medium Adult   Pulse: 87   Resp: 16   Temp: 97.3 °F (36.3 °C)   TempSrc: Temporal   SpO2: 99%   Weight: 188 lb (85.3 kg)   Height: 5' 7\" (1.702 m)     Body mass index is 29.44 kg/m². Lab Results   Component Value Date    WBC 5.9 01/07/2021    HGB 14.1 01/07/2021    HCT 43.1 01/07/2021     01/07/2021    AST 18 03/11/2021     03/11/2021    K 4.3 03/11/2021     03/11/2021    CREATININE 0.7 03/11/2021    BUN 8 03/11/2021    CO2 26 03/11/2021    TSH 1.280 03/11/2021    LABGLOM >90 03/11/2021    CALCIUM 9.5 03/11/2021       No results found. Diagnosis Orders   1. Wellness examination     2. Bacterial vaginosis  metroNIDAZOLE (FLAGYL) 500 MG tablet   3. Urinary frequency  POCT Urinalysis No Micro (Auto)   4. Major depressive disorder with single episode, in remission ([facility] [address]     5. Need for HPV vaccination  HPV Vaccine 9-valent IM       Plan  Flagyl for a week    hpv    ua normal       Return in about 1 year (around 11/11/2022) for physical .    Orders Placed:  Orders Placed This Encounter   Procedures    HPV Vaccine 9-valent IM    POCT Urinalysis No Micro (Auto)     Medications Prescribed:  Orders Placed This Encounter   Medications    metroNIDAZOLE (FLAGYL) 500 MG tablet     Sig: Take 1 tablet by mouth 2 times daily for 7 days     Dispense:  14 tablet     Refill:  0       No future appointments.     Health Maintenance Due   Topic Date Due    Hepatitis C screen  Never done    COVID-19 Vaccine (1) Never done    Chlamydia screen  04/12/2015    Flu vaccine (1) Never done    HPV vaccine (3 - 3-dose series) 10/30/2021         Attending Physician Statement  I have discussed the case, including pertinent history and exam findings with the resident. wellnessI agree with the documented assessment and plan as documented by the resident.   GE modifier added to this encounter      Raheel Hutchison DO 11/11/2021 2:04 PM

## 2021-11-11 NOTE — PATIENT INSTRUCTIONS
Thank you   1. Thank you for trusting us with your healthcare needs. You may receive a survey regarding today's visit. It would help us out if you would take a few moments to provide your feedback. We value your input. 2. Please bring in ALL medications BOTTLES, including inhalers, herbal supplements, over the counter, prescribed & non-prescribed medicine. The office would like actual medication bottles and a list.   3. Please note our OFFICE POLICIES:   a. Prior to getting your labs drawn, please check with your insurance company for benefits and eligibility of lab services. Often, insurance companies cover certain tests for preventative visits only. It is patient's responsibility to see what is covered. b. We are unable to change a diagnosis after the test has been performed. c. Lab orders will not be re-printed. Please hold onto your original lab orders and take them to your lab to be completed. d. If you no show your scheduled appointment three times, you will be dismissed from this practice. e. Melburn Helms must be completed 24 hours prior to your schedule appointment. 4. If the list below has been completed, PLEASE FAX RECORDS TO OUR OFFICE @ [phone]. Once the records have been received we will update your records at our office:  Health Maintenance Due   Topic Date Due    Hepatitis C screen  Never done    COVID-19 Vaccine (1) Never done    Chlamydia screen  04/12/2015    Flu vaccine (1) Never done    HPV vaccine (3 - 3-dose series) 10/30/2021           Patient Education        Well Visit, Ages 25 to 48: Care Instructions  Overview     Well visits can help you stay healthy. Your doctor has checked your overall health and may have suggested ways to take good care of yourself. Your doctor also may have recommended tests. At home, you can help prevent illness with healthy eating, regular exercise, and other steps. Follow-up care is a key part of your treatment and safety.  Be sure to make and go to all appointments, and call your doctor if you are having problems. It's also a good idea to know your test results and keep a list of the medicines you take. How can you care for yourself at home? · Get screening tests that you and your doctor decide on. Screening helps find diseases before any symptoms appear. · Eat healthy foods. Choose fruits, vegetables, whole grains, protein, and low-fat dairy foods. Limit fat, especially saturated fat. Reduce salt in your diet. · Limit alcohol. If you are a man, have no more than 2 drinks a day or 14 drinks a week. If you are a woman, have no more than 1 drink a day or 7 drinks a week. · Get at least 30 minutes of physical activity on most days of the week. Walking is a good choice. You also may want to do other activities, such as running, swimming, cycling, or playing tennis or team sports. Discuss any changes in your exercise program with your doctor. · Reach and stay at a healthy weight. This will lower your risk for many problems, such as obesity, diabetes, heart disease, and high blood pressure. · Do not smoke or allow others to smoke around you. If you need help quitting, talk to your doctor about stop-smoking programs and medicines. These can increase your chances of quitting for good. · Care for your mental health. It is easy to get weighed down by worry and stress. Learn strategies to manage stress, like deep breathing and mindfulness, and stay connected with your family and community. If you find you often feel sad or hopeless, talk with your doctor. Treatment can help. · Talk to your doctor about whether you have any risk factors for sexually transmitted infections (STIs). You can help prevent STIs if you wait to have sex with a new partner (or partners) until you've each been tested for STIs. It also helps if you use condoms (male or female condoms) and if you limit your sex partners to one person who only has sex with you.  Vaccines are available for some STIs, such as HPV. · Use birth control if it's important to you to prevent pregnancy. Talk with your doctor about the choices available and what might be best for you. · If you think you may have a problem with alcohol or drug use, talk to your doctor. This includes prescription medicines (such as amphetamines and opioids) and illegal drugs (such as cocaine and methamphetamine). Your doctor can help you figure out what type of treatment is best for you. · Protect your skin from too much sun. When you're outdoors from 10 a.m. to 4 p.m., stay in the shade or cover up with clothing and a hat with a wide brim. Wear sunglasses that block UV rays. Even when it's cloudy, put broad-spectrum sunscreen (SPF 30 or higher) on any exposed skin. · See a dentist one or two times a year for checkups and to have your teeth cleaned. · Wear a seat belt in the car. When should you call for help? Watch closely for changes in your health, and be sure to contact your doctor if you have any problems or symptoms that concern you. Where can you learn more? Go to [email or web address] org and sign in to your [employer] account. Enter P072 in the [employer] box to learn more about \"Well Visit, Ages 25 to 48: Care Instructions. \"     If you do not have an account, please click on the \"Sign Up Now\" link. Current as of: February 11, 2021               Content Version: 13.0  © 8182-1780 [employer]wise, [employer]. Care instructions adapted under license by [facility] ([facility]). If you have questions about a medical condition or this instruction, always ask your healthcare professional. [place] [address] any warranty or liability for your use of this information.

## 2021-11-11 NOTE — PROGRESS NOTES
Sharifa Francois is a 27 y.o. female who presents today for:  Chief Complaint   Patient presents with    Annual Exam     Wellness and 3rd HPV     HPI:   Sharifa Francois is 27 y. o. who presents today for third HPV vaccine, also has urinary frequency/incomplete emptying of her bladder the last few days, and fishy vaginal odor. Mood stable. No other concerns. Objective:     Vitals:    11/11/21 1245   BP: 112/74   Site: Left Upper Arm   Position: Sitting   Cuff Size: Medium Adult   Pulse: 87   Resp: 16   Temp: 97.3 °F (36.3 °C)   TempSrc: Temporal   SpO2: 99%   Weight: 188 lb (85.3 kg)   Height: 5' 7\" (1.702 m)       Wt Readings from Last 3 Encounters:   11/11/21 188 lb (85.3 kg)   08/05/21 188 lb 6.4 oz (85.5 kg)   04/30/21 187 lb 9.6 oz (85.1 kg)       BP Readings from Last 3 Encounters:   11/11/21 112/74   08/05/21 118/72   04/30/21 106/64       Lab Results   Component Value Date    WBC 5.9 01/07/2021    HGB 14.1 01/07/2021    HCT 43.1 01/07/2021    MCV 89.0 01/07/2021     01/07/2021     Lab Results   Component Value Date     03/11/2021    K 4.3 03/11/2021     03/11/2021    CO2 26 03/11/2021    BUN 8 03/11/2021    CREATININE 0.7 03/11/2021    GLUCOSE 71 03/11/2021    CALCIUM 9.5 03/11/2021    PROT 7.9 03/11/2021    LABALBU 4.5 03/11/2021    BILITOT 0.4 03/11/2021    ALKPHOS 61 03/11/2021    AST 18 03/11/2021    ALT 18 03/11/2021    LABGLOM >90 03/11/2021     Lab Results   Component Value Date    TSH 1.280 03/11/2021    T4FREE 1.42 03/11/2021       Review of Systems   Constitutional: Negative for chills, fatigue and fever. HENT: Negative for congestion, ear pain, postnasal drip and trouble swallowing. Eyes: Negative for pain and visual disturbance. Respiratory: Negative for cough and shortness of breath. Cardiovascular: Negative for chest pain and palpitations. Gastrointestinal: Negative for abdominal pain, blood in stool, constipation and diarrhea.    Genitourinary: Positive for urgency and vaginal discharge. Negative for dysuria and hematuria. Skin: Negative for rash and wound. Neurological: Negative for dizziness and headaches. Psychiatric/Behavioral: The patient is not nervous/anxious. Physical Exam  Vitals and nursing note reviewed. Constitutional:       General: She is not in acute distress. Appearance: She is well-developed. She is not diaphoretic. HENT:      Head: Normocephalic and atraumatic. Right Ear: External ear normal.      Left Ear: External ear normal.      Nose: Nose normal.   Eyes:      General: No scleral icterus. Right eye: No discharge. Left eye: No discharge. Conjunctiva/sclera: Conjunctivae normal.   Cardiovascular:      Rate and Rhythm: Normal rate and regular rhythm. Heart sounds: Normal heart sounds. No murmur heard. Pulmonary:      Effort: Pulmonary effort is normal.      Breath sounds: Normal breath sounds. Abdominal:      Palpations: Abdomen is soft. Tenderness: There is abdominal tenderness (epigastric). Musculoskeletal:      Cervical back: Normal range of motion. Skin:     General: Skin is warm and dry. Findings: No erythema or rash. Neurological:      Mental Status: She is alert and oriented to person, place, and time. Psychiatric:         Behavior: Behavior normal.         Thought Content:  Thought content normal.         Judgment: Judgment normal.         Immunization History   Administered Date(s) Administered    DTaP 01/21/1998, 06/12/2000, 07/05/2001, 08/14/2002, 10/18/2010    DTaP vaccine 01/21/1998, 06/12/2000, 07/05/2001, 08/14/2002    HPV 9-valent Shantal Zak) 04/30/2021, 06/03/2021    Hepatitis B 1997, 01/21/1998, 06/12/2000    Hepatitis B Ped/Adol (Engerix-B, Recombivax HB) 1997, 01/21/1998, 06/12/2000    Hib PRP-OMP (PedvaxHIB) 01/21/1998, 06/12/2000    Hib, unspecified 01/21/1998, 06/12/2000    MMR 06/12/2000, 07/05/2001    Meningococcal MCV4P (Menactra) 08/01/2013    Polio IPV (IPOL) 01/21/1998, 06/12/2000, 07/05/2001    Tdap (Boostrix, Adacel) 10/18/2010, 04/01/2018       Health Maintenance Due   Topic Date Due    Hepatitis C screen  Never done    COVID-19 Vaccine (1) Never done    Chlamydia screen  04/12/2015    Flu vaccine (1) Never done    HPV vaccine (3 - 3-dose series) 10/30/2021          Food Insecurity: No Food Insecurity    Worried About Running Out of Food in the Last Year: Never true    Saira of Food in the Last Year: Never true       Assessment / Plan:      Diagnosis Orders   1. Wellness examination     2. Bacterial vaginosis  metroNIDAZOLE (FLAGYL) 500 MG tablet   3. Urinary frequency  POCT Urinalysis No Micro (Auto)   4. Major depressive disorder with single episode, in remission ([facility] [address]     5. Need for HPV vaccination  HPV Vaccine 9-valent IM     Doing well overall. Mood stable. Encouraged flu and Covid vaccine  Third dose of HPV today  Metronidazole 500 mg p.o. twice daily for 7 days for bacterial vaginosis. Urinalysis for urinary frequency; UA negative. No additional antibiotics at this time. Continue diet and lifestyle modifications, exercise (30 minutes 5 times a week) for optimal blood pressure control and cardiovascular risk reduction            Return in about 1 year (around 11/11/2022) for physical .          Medications Prescribed:  Orders Placed This Encounter   Medications    metroNIDAZOLE (FLAGYL) 500 MG tablet     Sig: Take 1 tablet by mouth 2 times daily for 7 days     Dispense:  14 tablet     Refill:  0       No future appointments. Patient given educational materials - see patient instructions. Discussed use, benefit, and sideeffects of prescribed medications. All patient questions answered. Pt voiced understanding. Reviewed health maintenance. Instructed to continue current medications, diet and exercise. Patient agreed with treatment plan. Follow up as directed.      Electronically signed by Vee Shea, DO on 11/11/2021 at 1:28 PM

## 2022-01-17 DIAGNOSIS — Z80.3 FAMILY HISTORY OF MALIGNANT NEOPLASM OF BREAST: ICD-10-CM

## 2022-01-27 ENCOUNTER — OFFICE VISIT (OUTPATIENT)
Dept: FAMILY MEDICINE CLINIC | Age: 27
End: 2022-01-27
Payer: COMMERCIAL

## 2022-01-27 VITALS
DIASTOLIC BLOOD PRESSURE: 76 MMHG | BODY MASS INDEX: 30.45 KG/M2 | SYSTOLIC BLOOD PRESSURE: 110 MMHG | HEIGHT: 67 IN | HEART RATE: 100 BPM | TEMPERATURE: 97 F | RESPIRATION RATE: 18 BRPM | WEIGHT: 194 LBS | OXYGEN SATURATION: 99 %

## 2022-01-27 DIAGNOSIS — F32.5 MAJOR DEPRESSIVE DISORDER WITH SINGLE EPISODE, IN REMISSION (HCC): Primary | ICD-10-CM

## 2022-01-27 PROCEDURE — 99213 OFFICE O/P EST LOW 20 MIN: CPT | Performed by: STUDENT IN AN ORGANIZED HEALTH CARE EDUCATION/TRAINING PROGRAM

## 2022-01-27 SDOH — ECONOMIC STABILITY: FOOD INSECURITY: WITHIN THE PAST 12 MONTHS, THE FOOD YOU BOUGHT JUST DIDN'T LAST AND YOU DIDN'T HAVE MONEY TO GET MORE.: NEVER TRUE

## 2022-01-27 SDOH — ECONOMIC STABILITY: FOOD INSECURITY: WITHIN THE PAST 12 MONTHS, YOU WORRIED THAT YOUR FOOD WOULD RUN OUT BEFORE YOU GOT MONEY TO BUY MORE.: NEVER TRUE

## 2022-01-27 ASSESSMENT — ENCOUNTER SYMPTOMS
BLOOD IN STOOL: 0
COUGH: 0
ABDOMINAL PAIN: 0
EYE PAIN: 0
DIARRHEA: 0
SHORTNESS OF BREATH: 0
CONSTIPATION: 0
TROUBLE SWALLOWING: 0

## 2022-01-27 ASSESSMENT — SOCIAL DETERMINANTS OF HEALTH (SDOH): HOW HARD IS IT FOR YOU TO PAY FOR THE VERY BASICS LIKE FOOD, HOUSING, MEDICAL CARE, AND HEATING?: NOT HARD AT ALL

## 2022-01-27 NOTE — PROGRESS NOTES
S: 27 y.o. female with   Chief Complaint   Patient presents with    Depression     wants to talk about medication       HPI: please see resident note for HPI and ROS. BP Readings from Last 3 Encounters:   01/27/22 110/76   11/11/21 112/74   08/05/21 118/72     Wt Readings from Last 3 Encounters:   01/27/22 194 lb (88 kg)   11/11/21 188 lb (85.3 kg)   08/05/21 188 lb 6.4 oz (85.5 kg)       O: VS:  height is 5' 7\" (1.702 m) and weight is 194 lb (88 kg). Her skin temperature is 97 °F (36.1 °C). Her blood pressure is 110/76 and her pulse is 100. Her respiration is 18 and oxygen saturation is 99%. Diagnosis Orders   1. Major depressive disorder with single episode, in remission (HCC)  sertraline (ZOLOFT) 50 MG tablet       Plan:  Restart zoloft 50mg daily. F/u 6 wks. Health Maintenance Due   Topic Date Due    Hepatitis C screen  Never done    COVID-19 Vaccine (1) Never done    Chlamydia screen  04/12/2015    Flu vaccine (1) Never done       Attending Physician Statement  I have discussed the case, including pertinent history and exam findings with the resident. I agree with the documented assessment and plan as documented by the resident.         Marito Kerr, DO 1/27/2022 3:17 PM

## 2022-01-27 NOTE — PROGRESS NOTES
Winsome Jha is a 27 y.o. female who presents today for:  Chief Complaint   Patient presents with    Depression     wants to talk about medication     HPI:   Winsome Jha is 27 y. o. who presents today for depression, would like to restart zoloft. Previously on 50 mg. Tolerated it well. Declines therapy. Would also like to resume FMLA intermittently a few times a month for when her kids are sick. Objective:     Vitals:    01/27/22 1500   BP: 110/76   Site: Left Upper Arm   Position: Sitting   Cuff Size: Medium Adult   Pulse: 100   Resp: 18   Temp: 97 °F (36.1 °C)   TempSrc: Skin   SpO2: 99%   Weight: 194 lb (88 kg)   Height: 5' 7\" (1.702 m)       Wt Readings from Last 3 Encounters:   01/27/22 194 lb (88 kg)   11/11/21 188 lb (85.3 kg)   08/05/21 188 lb 6.4 oz (85.5 kg)       BP Readings from Last 3 Encounters:   01/27/22 110/76   11/11/21 112/74   08/05/21 118/72       Lab Results   Component Value Date    WBC 5.9 01/07/2021    HGB 14.1 01/07/2021    HCT 43.1 01/07/2021    MCV 89.0 01/07/2021     01/07/2021     Lab Results   Component Value Date     03/11/2021    K 4.3 03/11/2021     03/11/2021    CO2 26 03/11/2021    BUN 8 03/11/2021    CREATININE 0.7 03/11/2021    GLUCOSE 71 03/11/2021    CALCIUM 9.5 03/11/2021    PROT 7.9 03/11/2021    LABALBU 4.5 03/11/2021    BILITOT 0.4 03/11/2021    ALKPHOS 61 03/11/2021    AST 18 03/11/2021    ALT 18 03/11/2021    LABGLOM >90 03/11/2021     Lab Results   Component Value Date    TSH 1.280 03/11/2021    T4FREE 1.42 03/11/2021     No results found for: LABA1C  No results found for: EAG  No results found for: CHOL  No results found for: TRIG  No results found for: HDL  No results found for: LDLCHOLESTEROL, LDLCALC    No results found for: LABMICR, [id number]    Review of Systems   Constitutional: Negative for chills, fatigue and fever. HENT: Negative for congestion, ear pain, postnasal drip and trouble swallowing.     Eyes: Negative for pain and visual disturbance. Respiratory: Negative for cough and shortness of breath. Cardiovascular: Negative for chest pain and palpitations. Gastrointestinal: Negative for abdominal pain, blood in stool, constipation and diarrhea. Genitourinary: Negative for dysuria and hematuria. Skin: Negative for rash and wound. Neurological: Negative for dizziness and headaches. Psychiatric/Behavioral: The patient is not nervous/anxious. Physical Exam  Vitals and nursing note reviewed. Constitutional:       General: She is not in acute distress. Appearance: She is well-developed. She is not diaphoretic. HENT:      Head: Normocephalic and atraumatic. Right Ear: External ear normal.      Left Ear: External ear normal.      Nose: Nose normal.   Eyes:      General: No scleral icterus. Right eye: No discharge. Left eye: No discharge. Conjunctiva/sclera: Conjunctivae normal.   Pulmonary:      Effort: Pulmonary effort is normal.   Musculoskeletal:      Cervical back: Normal range of motion. Skin:     General: Skin is warm and dry. Findings: No erythema or rash. Neurological:      Mental Status: She is alert and oriented to person, place, and time. Psychiatric:         Mood and Affect: Affect is flat. Behavior: Behavior is withdrawn. Thought Content:  Thought content normal.         Judgment: Judgment normal.         Immunization History   Administered Date(s) Administered    DTaP 01/21/1998, 06/12/2000, 07/05/2001, 08/14/2002, 10/18/2010    DTaP vaccine 01/21/1998, 06/12/2000, 07/05/2001, 08/14/2002    HPV 9-valent Lilliam Carls) 04/30/2021, 06/03/2021, 11/11/2021    Hepatitis B 1997, 01/21/1998, 06/12/2000    Hepatitis B Ped/Adol (Engerix-B, Recombivax HB) 1997, 01/21/1998, 06/12/2000    Hib PRP-OMP (PedvaxHIB) 01/21/1998, 06/12/2000    Hib, unspecified 01/21/1998, 06/12/2000    MMR 06/12/2000, 07/05/2001    Meningococcal MCV4P (Menactra) 08/01/2013    Polio IPV (IPOL) 01/21/1998, 06/12/2000, 07/05/2001    Tdap (Boostrix, Adacel) 10/18/2010, 04/01/2018       Health Maintenance Due   Topic Date Due    Hepatitis C screen  Never done    COVID-19 Vaccine (1) Never done    Chlamydia screen  04/12/2015    Flu vaccine (1) Never done          Food Insecurity: No Food Insecurity    Worried About Running Out of Food in the Last Year: Never true    Ran Out of Food in the Last Year: Never true       Assessment / Plan:      Diagnosis Orders   1. Major depressive disorder with single episode, in remission (HCC)  sertraline (ZOLOFT) 50 MG tablet       Resume zoloft - cut in half for first week then full tablet after 1 week  Consider trazodone in the future - pt to call back if she wants to start  Will fill out intermittent FMLA when she drops it off   Follow up in 6 weeks        Return in about 6 weeks (around 3/10/2022), or if symptoms worsen or fail to improve. Medications Prescribed:  Orders Placed This Encounter   Medications    sertraline (ZOLOFT) 50 MG tablet     Sig: Take 1 tablet by mouth daily     Dispense:  90 tablet     Refill:  3       No future appointments. Patient given educational materials - see patient instructions. Discussed use, benefit, and sideeffects of prescribed medications. All patient questions answered. Pt voiced understanding. Reviewed health maintenance. Instructed to continue current medications, diet and exercise. Patient agreed with treatment plan. Follow up as directed.      Electronically signed by Natanael Youngblood DO on 1/27/2022 at 3:22 PM

## 2022-02-09 ENCOUNTER — TELEPHONE (OUTPATIENT)
Dept: FAMILY MEDICINE CLINIC | Age: 27
End: 2022-02-09

## 2023-02-02 ENCOUNTER — OFFICE VISIT (OUTPATIENT)
Dept: FAMILY MEDICINE CLINIC | Age: 28
End: 2023-02-02

## 2023-02-02 VITALS
DIASTOLIC BLOOD PRESSURE: 70 MMHG | WEIGHT: 197.8 LBS | HEIGHT: 67 IN | RESPIRATION RATE: 10 BRPM | SYSTOLIC BLOOD PRESSURE: 114 MMHG | TEMPERATURE: 98.1 F | HEART RATE: 82 BPM | BODY MASS INDEX: 31.04 KG/M2 | OXYGEN SATURATION: 100 %

## 2023-02-02 DIAGNOSIS — F32.5 MAJOR DEPRESSIVE DISORDER WITH SINGLE EPISODE, IN REMISSION (HCC): ICD-10-CM

## 2023-02-02 SDOH — ECONOMIC STABILITY: FOOD INSECURITY: WITHIN THE PAST 12 MONTHS, THE FOOD YOU BOUGHT JUST DIDN'T LAST AND YOU DIDN'T HAVE MONEY TO GET MORE.: NEVER TRUE

## 2023-02-02 SDOH — ECONOMIC STABILITY: HOUSING INSECURITY
IN THE LAST 12 MONTHS, WAS THERE A TIME WHEN YOU DID NOT HAVE A STEADY PLACE TO SLEEP OR SLEPT IN A SHELTER (INCLUDING NOW)?: NO

## 2023-02-02 SDOH — ECONOMIC STABILITY: INCOME INSECURITY: HOW HARD IS IT FOR YOU TO PAY FOR THE VERY BASICS LIKE FOOD, HOUSING, MEDICAL CARE, AND HEATING?: NOT VERY HARD

## 2023-02-02 SDOH — ECONOMIC STABILITY: FOOD INSECURITY: WITHIN THE PAST 12 MONTHS, YOU WORRIED THAT YOUR FOOD WOULD RUN OUT BEFORE YOU GOT MONEY TO BUY MORE.: NEVER TRUE

## 2023-02-02 ASSESSMENT — PATIENT HEALTH QUESTIONNAIRE - PHQ9
5. POOR APPETITE OR OVEREATING: 0
2. FEELING DOWN, DEPRESSED OR HOPELESS: 1
SUM OF ALL RESPONSES TO PHQ QUESTIONS 1-9: 7
1. LITTLE INTEREST OR PLEASURE IN DOING THINGS: 3
SUM OF ALL RESPONSES TO PHQ QUESTIONS 1-9: 7
3. TROUBLE FALLING OR STAYING ASLEEP: 0
8. MOVING OR SPEAKING SO SLOWLY THAT OTHER PEOPLE COULD HAVE NOTICED. OR THE OPPOSITE, BEING SO FIGETY OR RESTLESS THAT YOU HAVE BEEN MOVING AROUND A LOT MORE THAN USUAL: 0
7. TROUBLE CONCENTRATING ON THINGS, SUCH AS READING THE NEWSPAPER OR WATCHING TELEVISION: 0
SUM OF ALL RESPONSES TO PHQ9 QUESTIONS 1 & 2: 4
4. FEELING TIRED OR HAVING LITTLE ENERGY: 3
6. FEELING BAD ABOUT YOURSELF - OR THAT YOU ARE A FAILURE OR HAVE LET YOURSELF OR YOUR FAMILY DOWN: 0
10. IF YOU CHECKED OFF ANY PROBLEMS, HOW DIFFICULT HAVE THESE PROBLEMS MADE IT FOR YOU TO DO YOUR WORK, TAKE CARE OF THINGS AT HOME, OR GET ALONG WITH OTHER PEOPLE: 1
9. THOUGHTS THAT YOU WOULD BE BETTER OFF DEAD, OR OF HURTING YOURSELF: 0
SUM OF ALL RESPONSES TO PHQ QUESTIONS 1-9: 7
SUM OF ALL RESPONSES TO PHQ QUESTIONS 1-9: 7

## 2023-02-02 ASSESSMENT — ENCOUNTER SYMPTOMS
CONSTIPATION: 0
WHEEZING: 0
NAUSEA: 0
COUGH: 0
SHORTNESS OF BREATH: 0
VOMITING: 0
DIARRHEA: 0
BACK PAIN: 0
ABDOMINAL PAIN: 0

## 2023-02-02 NOTE — PROGRESS NOTES
Cristina Pelletier is a 24 y.o. female who presents today for:  Chief Complaint   Patient presents with    Follow-up     Pt presents for a f/u for meds. Pt states she has been doing good. HPI:   Cristina Pelletier is 24 y.o. who presents today for follow-up of depression. Patient has a history of depression for the past 10 years, and has been intermittently on medication during this time. She has been on Zoloft 50 mg most recently, and reports she is taking this mostly as needed. She would like medication for a short period of time when mood is down, but will stop the medication when mood improves. Reports medication is helpful at improving her mood when she takes it. She is hesitant to take medication daily, she does not want to feel that she needs this medication to feel normal.  Overall, patient Dors decreased interest in doing things that she enjoys, she does feel that her mood is down, and has little energy. Patient also had intermittent FMLA paperwork completed 1 year ago. She is interested in having this extended. Per patient, she typically needs up to 2 days/month off due to depressive symptoms, and 2 days/month to follow-up for appointments. She does have some incidences where she does not use the FMLA days. Otherwise, patient doing well. She declines vaccines today. Most recent Pap completed a few months ago at the [facility] prior to receiving Depo-Provera injection.       Objective:     Vitals:    02/02/23 1527   BP: 114/70   Pulse: 82   Resp: 10   Temp: 98.1 °F (36.7 °C)   SpO2: 100%   Weight: 197 lb 12.8 oz (89.7 kg)   Height: 5' 7\" (1.702 m)       Wt Readings from Last 3 Encounters:   02/02/23 197 lb 12.8 oz (89.7 kg)   01/27/22 194 lb (88 kg)   11/11/21 188 lb (85.3 kg)       BP Readings from Last 3 Encounters:   02/02/23 114/70   01/27/22 110/76   11/11/21 112/74       Lab Results   Component Value Date    WBC 5.9 01/07/2021    HGB 14.1 01/07/2021    HCT 43.1 01/07/2021    MCV 89.0 01/07/2021     01/07/2021     Lab Results   Component Value Date     03/11/2021    K 4.3 03/11/2021     03/11/2021    CO2 26 03/11/2021    BUN 8 03/11/2021    CREATININE 0.7 03/11/2021    GLUCOSE 71 03/11/2021    CALCIUM 9.5 03/11/2021    PROT 7.9 03/11/2021    LABALBU 4.5 03/11/2021    BILITOT 0.4 03/11/2021    ALKPHOS 61 03/11/2021    AST 18 03/11/2021    ALT 18 03/11/2021    LABGLOM >90 03/11/2021     Lab Results   Component Value Date    TSH 1.280 03/11/2021    T4FREE 1.42 03/11/2021     No results found for: LABA1C  No results found for: EAG  No results found for: CHOL  No results found for: TRIG  No results found for: HDL  No results found for: LDLCHOLESTEROL, LDLCALC    No results found for: LABMICR, [id number]    Review of Systems   Constitutional:  Negative for activity change, chills, fatigue and fever. HENT:  Negative for congestion. Eyes:  Negative for visual disturbance. Respiratory:  Negative for cough, shortness of breath and wheezing. Cardiovascular:  Negative for chest pain and palpitations. Gastrointestinal:  Negative for abdominal pain, constipation, diarrhea, nausea and vomiting. Genitourinary:  Negative for dysuria. Musculoskeletal:  Negative for back pain. Neurological:  Negative for dizziness, syncope, light-headedness and headaches. Psychiatric/Behavioral:  Positive for dysphoric mood and sleep disturbance. Negative for self-injury and suicidal ideas. The patient is not nervous/anxious. Physical Exam  Vitals and nursing note reviewed. Constitutional:       Appearance: Normal appearance. She is normal weight. HENT:      Head: Normocephalic and atraumatic. Nose: Nose normal.      Mouth/Throat:      Mouth: Mucous membranes are moist.   Eyes:      Extraocular Movements: Extraocular movements intact. Conjunctiva/sclera: Conjunctivae normal.      Pupils: Pupils are equal, round, and reactive to light.    Cardiovascular: Rate and Rhythm: Normal rate and regular rhythm. Pulses: Normal pulses. Heart sounds: No murmur heard. Pulmonary:      Effort: Pulmonary effort is normal. No respiratory distress. Breath sounds: Normal breath sounds. No wheezing. Abdominal:      General: Abdomen is flat. Bowel sounds are normal. There is no distension. Palpations: Abdomen is soft. There is no mass. Tenderness: There is no abdominal tenderness. Musculoskeletal:      Cervical back: Neck supple. Skin:     General: Skin is warm and dry. Neurological:      General: No focal deficit present. Mental Status: She is alert and oriented to person, place, and time. Psychiatric:         Mood and Affect: Mood is depressed. Affect is flat. Behavior: Behavior normal.       Immunization History   Administered Date(s) Administered    DTaP 01/21/1998, 06/12/2000, 07/05/2001, 08/14/2002, 10/18/2010    DTaP vaccine 01/21/1998, 06/12/2000, 07/05/2001, 08/14/2002    HPV 9-valent She Mooneyst) 04/30/2021, 06/03/2021, 11/11/2021    Hepatitis B 1997, 01/21/1998, 06/12/2000    Hepatitis B Ped/Adol (Engerix-B, Recombivax HB) 1997, 01/21/1998, 06/12/2000    Hib PRP-OMP (PedvaxHIB) 01/21/1998, 06/12/2000    Hib, unspecified 01/21/1998, 06/12/2000    MMR 06/12/2000, 07/05/2001    Meningococcal MCV4P (Menactra) 08/01/2013    Polio IPV (IPOL) 01/21/1998, 06/12/2000, 07/05/2001    Tdap (Boostrix, Adacel) 10/18/2010, 04/01/2018       Health Maintenance Due   Topic Date Due    COVID-19 Vaccine (1) Never done    Hepatitis C screen  Never done    Depression Monitoring  02/25/2022    Flu vaccine (1) Never done       Food Insecurity: No Food Insecurity    Worried About [employer] in the Last Year: Never true    Ran Out of Food in the Last Year: Never true       Assessment / Plan:   1. Major depressive disorder with single episode, in remission (HCC)  Chronic stable. Continue current dose of Zoloft 50 mg daily.   Long discussion with patient on importance of taking this medication daily even if she feels mood is well controlled. -Continue medication family, follow-up on consideration of tapering off medication in the future  -[facility] paperwork to be completed once patient drops this off  - sertraline (ZOLOFT) 50 MG tablet; Take 1 tablet by mouth daily  Dispense: 90 tablet; Refill: 3           Return in about 3 months (around 5/2/2023). Medications Prescribed:  Orders Placed This Encounter   Medications    sertraline (ZOLOFT) 50 MG tablet     Sig: Take 1 tablet by mouth daily     Dispense:  90 tablet     Refill:  3       Future Appointments   Date Time Provider Andrea Evita   5/22/2023  9:40 AM Yaneli Schmid MD [facility] [id number] [facility]       Patient given educational materials - see patient instructions. Discussed use, benefit, and sideeffects of prescribed medications. All patient questions answered. Pt voiced understanding. Reviewed health maintenance. Instructed to continue current medications, diet and exercise. Patient agreed with treatment plan. Follow up as directed.      Electronically signed by Yaneli Schmid MD on 2/2/2023 at 10:04 PM

## 2023-02-02 NOTE — PROGRESS NOTES
S: 24 y.o. female with   Chief Complaint   Patient presents with    Follow-up     Pt presents for a f/u for meds. Pt states she has been doing good. HPI: please see resident note for HPI and ROS. 82-year-old female with history of depression here for follow-up. Taking Zoloft 50 mg as needed, but does feel good when she takes her medication. Denies new concerns. BP Readings from Last 3 Encounters:   02/02/23 114/70   01/27/22 110/76   11/11/21 112/74     Wt Readings from Last 3 Encounters:   02/02/23 197 lb 12.8 oz (89.7 kg)   01/27/22 194 lb (88 kg)   11/11/21 188 lb (85.3 kg)       O: VS:  height is 5' 7\" (1.702 m) and weight is 197 lb 12.8 oz (89.7 kg). Her temperature is 98.1 °F (36.7 °C). Her blood pressure is 114/70 and her pulse is 82. Her respiration is 10 and oxygen saturation is 100%. Diagnosis Orders   1. Major depressive disorder with single episode, in remission ([facility] [address]  sertraline (ZOLOFT) 50 MG tablet          Plan:  Please refer to resident note for full plan. Depression: Chronic, stable. No SI. Encourage consistency with taking Zoloft 50 mg daily and not missing doses. Follow-up in 3 months for recheck or sooner if needed. Health Maintenance Due   Topic Date Due    COVID-19 Vaccine (1) Never done    Hepatitis C screen  Never done    Flu vaccine (1) Never done       Attending Physician Statement  I have discussed the case, including pertinent history and exam findings with the resident. I agree with the documented assessment and plan as documented by the resident.         Ling Dey,  2/3/2023 12:06 PM

## 2023-02-28 ENCOUNTER — TELEPHONE (OUTPATIENT)
Dept: FAMILY MEDICINE CLINIC | Age: 28
End: 2023-02-28

## 2023-02-28 NOTE — TELEPHONE ENCOUNTER
Pt stopped by today to have you fill out some FMLA paperwork for her employer [employer]. Paperwork was placed in your mailbox. Thank you,  Anjali Singleton.

## 2023-03-14 NOTE — TELEPHONE ENCOUNTER
Patient called stating there are missing part that need filled out on the [facility] paperwork, section 7 & 8 needs duration. Patient states this paperwork is due tomorrow.     [facility], please fill out missing parts & initial.  Thank you

## 2023-04-24 ENCOUNTER — NURSE ONLY (OUTPATIENT)
Dept: LAB | Age: 28
End: 2023-04-24

## 2023-05-03 LAB — CYTOLOGY THIN PREP PAP: NORMAL

## 2023-11-06 ENCOUNTER — OFFICE VISIT (OUTPATIENT)
Dept: FAMILY MEDICINE CLINIC | Age: 28
End: 2023-11-06
Payer: COMMERCIAL

## 2023-11-06 VITALS
RESPIRATION RATE: 16 BRPM | OXYGEN SATURATION: 99 % | HEART RATE: 77 BPM | BODY MASS INDEX: 30.26 KG/M2 | DIASTOLIC BLOOD PRESSURE: 72 MMHG | WEIGHT: 192.8 LBS | TEMPERATURE: 98.1 F | SYSTOLIC BLOOD PRESSURE: 110 MMHG | HEIGHT: 67 IN

## 2023-11-06 DIAGNOSIS — Z11.59 NEED FOR HEPATITIS C SCREENING TEST: ICD-10-CM

## 2023-11-06 DIAGNOSIS — Z00.00 ENCOUNTER FOR WELL ADULT EXAM WITHOUT ABNORMAL FINDINGS: Primary | ICD-10-CM

## 2023-11-06 DIAGNOSIS — R53.83 FATIGUE, UNSPECIFIED TYPE: ICD-10-CM

## 2023-11-06 DIAGNOSIS — F32.5 MAJOR DEPRESSIVE DISORDER WITH SINGLE EPISODE, IN REMISSION (HCC): ICD-10-CM

## 2023-11-06 DIAGNOSIS — Z13.220 NEED FOR LIPID SCREENING: ICD-10-CM

## 2023-11-06 PROCEDURE — 99395 PREV VISIT EST AGE 18-39: CPT | Performed by: STUDENT IN AN ORGANIZED HEALTH CARE EDUCATION/TRAINING PROGRAM

## 2023-11-06 ASSESSMENT — ENCOUNTER SYMPTOMS
SHORTNESS OF BREATH: 0
CONSTIPATION: 0
DIARRHEA: 0
WHEEZING: 0
COUGH: 0
BACK PAIN: 0
ABDOMINAL PAIN: 0
NAUSEA: 0
VOMITING: 0

## 2024-01-22 ENCOUNTER — NURSE ONLY (OUTPATIENT)
Dept: LAB | Age: 29
End: 2024-01-22

## 2024-01-22 DIAGNOSIS — Z13.220 NEED FOR LIPID SCREENING: ICD-10-CM

## 2024-01-22 DIAGNOSIS — Z11.59 NEED FOR HEPATITIS C SCREENING TEST: ICD-10-CM

## 2024-01-22 DIAGNOSIS — R53.83 FATIGUE, UNSPECIFIED TYPE: ICD-10-CM

## 2024-01-22 LAB
ALBUMIN SERPL BCG-MCNC: 4.8 G/DL (ref 3.5–5.1)
ALP SERPL-CCNC: 68 U/L (ref 38–126)
ALT SERPL W/O P-5'-P-CCNC: 22 U/L (ref 11–66)
ANION GAP SERPL CALC-SCNC: 16 MEQ/L (ref 8–16)
AST SERPL-CCNC: 18 U/L (ref 5–40)
BASOPHILS ABSOLUTE: 0 THOU/MM3 (ref 0–0.1)
BASOPHILS NFR BLD AUTO: 0.1 %
BILIRUB SERPL-MCNC: 0.4 MG/DL (ref 0.3–1.2)
BUN SERPL-MCNC: 8 MG/DL (ref 7–22)
CALCIUM SERPL-MCNC: 9.5 MG/DL (ref 8.5–10.5)
CHLORIDE SERPL-SCNC: 105 MEQ/L (ref 98–111)
CHOLEST SERPL-MCNC: 141 MG/DL (ref 100–199)
CO2 SERPL-SCNC: 22 MEQ/L (ref 23–33)
CREAT SERPL-MCNC: 0.6 MG/DL (ref 0.4–1.2)
DEPRECATED RDW RBC AUTO: 37.9 FL (ref 35–45)
EOSINOPHIL NFR BLD AUTO: 0.4 %
EOSINOPHILS ABSOLUTE: 0 THOU/MM3 (ref 0–0.4)
ERYTHROCYTE [DISTWIDTH] IN BLOOD BY AUTOMATED COUNT: 11.9 % (ref 11.5–14.5)
GFR SERPL CREATININE-BSD FRML MDRD: > 60 ML/MIN/1.73M2
GLUCOSE SERPL-MCNC: 86 MG/DL (ref 70–108)
HCT VFR BLD AUTO: 44.4 % (ref 37–47)
HCV IGG SERPL QL IA: NEGATIVE
HDLC SERPL-MCNC: 53 MG/DL
HGB BLD-MCNC: 14.4 GM/DL (ref 12–16)
IMM GRANULOCYTES # BLD AUTO: 0.01 THOU/MM3 (ref 0–0.07)
IMM GRANULOCYTES NFR BLD AUTO: 0.1 %
LDLC SERPL CALC-MCNC: 74 MG/DL
LYMPHOCYTES ABSOLUTE: 2 THOU/MM3 (ref 1–4.8)
LYMPHOCYTES NFR BLD AUTO: 26 %
MCH RBC QN AUTO: 28.5 PG (ref 26–33)
MCHC RBC AUTO-ENTMCNC: 32.4 GM/DL (ref 32.2–35.5)
MCV RBC AUTO: 87.9 FL (ref 81–99)
MONOCYTES ABSOLUTE: 0.5 THOU/MM3 (ref 0.4–1.3)
MONOCYTES NFR BLD AUTO: 6.3 %
NEUTROPHILS NFR BLD AUTO: 67.1 %
NRBC BLD AUTO-RTO: 0 /100 WBC
PLATELET # BLD AUTO: 226 THOU/MM3 (ref 130–400)
PMV BLD AUTO: 9.9 FL (ref 9.4–12.4)
POTASSIUM SERPL-SCNC: 4.1 MEQ/L (ref 3.5–5.2)
PROT SERPL-MCNC: 7.9 G/DL (ref 6.1–8)
RBC # BLD AUTO: 5.05 MILL/MM3 (ref 4.2–5.4)
SEGMENTED NEUTROPHILS ABSOLUTE COUNT: 5.2 THOU/MM3 (ref 1.8–7.7)
SODIUM SERPL-SCNC: 143 MEQ/L (ref 135–145)
T4 FREE SERPL-MCNC: 1.48 NG/DL (ref 0.93–1.76)
TRIGL SERPL-MCNC: 70 MG/DL (ref 0–199)
TSH SERPL DL<=0.005 MIU/L-ACNC: 1.68 UIU/ML (ref 0.4–4.2)
WBC # BLD AUTO: 7.7 THOU/MM3 (ref 4.8–10.8)

## 2024-01-23 ENCOUNTER — TELEPHONE (OUTPATIENT)
Dept: FAMILY MEDICINE CLINIC | Age: 29
End: 2024-01-23

## 2024-01-23 NOTE — TELEPHONE ENCOUNTER
----- Message from Maria Teresa Nava MD sent at 1/23/2024  8:27 AM EST -----  Nathen,    Labs all look reassuring at this time - no need for further testing currently. Thanks!

## 2024-02-05 ENCOUNTER — TELEPHONE (OUTPATIENT)
Dept: FAMILY MEDICINE CLINIC | Age: 29
End: 2024-02-05

## 2024-02-05 NOTE — TELEPHONE ENCOUNTER
Nathen stopped in office today asking for us to complete forms for work , she stated that these forms are just an extension of already completed paperwork . Patient states the dates need to be the same as the original forms completed in 2023 since they are just extension of those forms. Forms are attached , scanned and placed in providers mailbox .

## 2024-02-13 ENCOUNTER — TELEPHONE (OUTPATIENT)
Dept: FAMILY MEDICINE CLINIC | Age: 29
End: 2024-02-13

## 2024-02-13 NOTE — TELEPHONE ENCOUNTER
Patient called the office back and states that the paperwork needs to say like on hours it needs to list 8 hrs because she works 8 hour shifts. Also, the frequency needs to be double checked. Episodes were 2 times a month lasting up to 2 days. Please advise. Patient states that she is going to upload the previously approved documents in [employer].

## 2024-02-13 NOTE — TELEPHONE ENCOUNTER
Patient called the office back and we went over [facility] paperwork. Patient asked for us to refax it to her employer. Paperwork re-faxed and scanned into media.

## 2024-02-13 NOTE — TELEPHONE ENCOUNTER
----- Message from Elizabeth Almodovar sent at 2/13/2024  8:55 AM EST -----  Subject: Message to Provider    QUESTIONS  Information for Provider? Patient returning phone call she received from   office staff regarding completion of work form, tried reaching out to   office staff no answer, patient would like call back  ---------------------------------------------------------------------------  --------------  CALL BACK INFO  [phone]; OK to leave message on voicemail  ---------------------------------------------------------------------------  --------------  SCRIPT ANSWERS  undefined

## 2024-02-14 NOTE — TELEPHONE ENCOUNTER
Please print out FMLA forms from 2/12/24 that were previously completed and I will complete this section. Thanks!

## 2024-02-29 ENCOUNTER — OFFICE VISIT (OUTPATIENT)
Dept: FAMILY MEDICINE CLINIC | Age: 29
End: 2024-02-29
Payer: COMMERCIAL

## 2024-02-29 VITALS
DIASTOLIC BLOOD PRESSURE: 84 MMHG | HEART RATE: 86 BPM | HEIGHT: 67 IN | TEMPERATURE: 98.9 F | BODY MASS INDEX: 29.95 KG/M2 | RESPIRATION RATE: 16 BRPM | WEIGHT: 190.8 LBS | SYSTOLIC BLOOD PRESSURE: 120 MMHG | OXYGEN SATURATION: 99 %

## 2024-02-29 DIAGNOSIS — H81.10 BENIGN PAROXYSMAL POSITIONAL VERTIGO, UNSPECIFIED LATERALITY: Primary | ICD-10-CM

## 2024-02-29 DIAGNOSIS — R00.0 TACHYCARDIA: ICD-10-CM

## 2024-02-29 PROCEDURE — 93000 ELECTROCARDIOGRAM COMPLETE: CPT | Performed by: FAMILY MEDICINE

## 2024-02-29 PROCEDURE — 99213 OFFICE O/P EST LOW 20 MIN: CPT | Performed by: STUDENT IN AN ORGANIZED HEALTH CARE EDUCATION/TRAINING PROGRAM

## 2024-02-29 SDOH — ECONOMIC STABILITY: INCOME INSECURITY: HOW HARD IS IT FOR YOU TO PAY FOR THE VERY BASICS LIKE FOOD, HOUSING, MEDICAL CARE, AND HEATING?: PATIENT DECLINED

## 2024-02-29 ASSESSMENT — PATIENT HEALTH QUESTIONNAIRE - PHQ9
7. TROUBLE CONCENTRATING ON THINGS, SUCH AS READING THE NEWSPAPER OR WATCHING TELEVISION: 0
SUM OF ALL RESPONSES TO PHQ QUESTIONS 1-9: 1
SUM OF ALL RESPONSES TO PHQ9 QUESTIONS 1 & 2: 0
SUM OF ALL RESPONSES TO PHQ QUESTIONS 1-9: 1
3. TROUBLE FALLING OR STAYING ASLEEP: 0
SUM OF ALL RESPONSES TO PHQ QUESTIONS 1-9: 1
4. FEELING TIRED OR HAVING LITTLE ENERGY: 1
5. POOR APPETITE OR OVEREATING: 0
9. THOUGHTS THAT YOU WOULD BE BETTER OFF DEAD, OR OF HURTING YOURSELF: 0
6. FEELING BAD ABOUT YOURSELF - OR THAT YOU ARE A FAILURE OR HAVE LET YOURSELF OR YOUR FAMILY DOWN: 0
1. LITTLE INTEREST OR PLEASURE IN DOING THINGS: 0
10. IF YOU CHECKED OFF ANY PROBLEMS, HOW DIFFICULT HAVE THESE PROBLEMS MADE IT FOR YOU TO DO YOUR WORK, TAKE CARE OF THINGS AT HOME, OR GET ALONG WITH OTHER PEOPLE: 0
8. MOVING OR SPEAKING SO SLOWLY THAT OTHER PEOPLE COULD HAVE NOTICED. OR THE OPPOSITE, BEING SO FIGETY OR RESTLESS THAT YOU HAVE BEEN MOVING AROUND A LOT MORE THAN USUAL: 0
SUM OF ALL RESPONSES TO PHQ QUESTIONS 1-9: 1
2. FEELING DOWN, DEPRESSED OR HOPELESS: 0

## 2024-02-29 ASSESSMENT — ENCOUNTER SYMPTOMS
SHORTNESS OF BREATH: 0
COUGH: 0

## 2024-02-29 NOTE — PROGRESS NOTES
[facility] PROFESSIONAL SERVS  [facility] MEDICINE PRACTICE  [address]  [facility] [address]  Dept: [phone]  Dept Fax: [phone]  Loc: [phone]  Resident Note    Assessment & Plan:    1. Benign paroxysmal positional vertigo, unspecified laterality    2. Tachycardia       Orders Placed This Encounter    EKG 12 Lead - Clinic Performed     Order Specific Question:   Reason for Exam?     Answer:   Irregular heart rate      EKG was obtained in office-normal sinus rhythm without QTc prolongation.  Tachycardia is likely secondary to deconditioning.  May be some contribution from caffeine intake. Advised cardiovascular exercise.    Patient has BPPV.  Positive [place].  Home exercises given.  Discussed with patient that if vertigo worsens or becomes persistent or is associated with hearing loss, or if there are new symptoms, to return to clinic or go to the emergency department immediately.  Also offered physical therapy/vestibular therapy if patient prefers, patient to try home exercises first.  Hold off on meclizine given short duration of symptoms.    No follow-ups on file.    Future Appointments   Date Time Provider Department Center   5/6/2024 10:40 AM Maria Teresa Nava MD [facility]  RES [facility] [place]       -----------------------------------------------------------------------------------------------------------    HPI    Nathen Barnes is a 29 y.o. female who presents today for:    Chief Complaint   Patient presents with    Tachycardia     Patient in office today for dizziness and high heart rate. States that earlier today was 122 while just standing, waiting for elevator, states that it has been higher than that. States it fluctuates daily. Noticed about a week ago.      Dizziness started 1.5 weeks ago, lasts 2-5 seconds.  Always associated with laying down.  Otherwise, not persistent.  Does feel slightly nauseous for those brief moments.  No other symptoms    Was

## 2024-02-29 NOTE — PROGRESS NOTES
S: 29 y.o. female with   Chief Complaint   Patient presents with    Tachycardia     Patient in office today for dizziness and high heart rate. States that earlier today was 122 while just standing, waiting for elevator, states that it has been higher than that. States it fluctuates daily. Noticed about a week ago.        Chief complaint, [place], and all pertinent details of the case reviewed with the resident.    Please see resident's note for specific details discussed at today's visit.    BP Readings from Last 3 Encounters:   02/29/24 120/84   11/06/23 110/72   02/02/23 114/70     Wt Readings from Last 3 Encounters:   02/29/24 86.5 kg (190 lb 12.8 oz)   11/06/23 87.5 kg (192 lb 12.8 oz)   02/02/23 89.7 kg (197 lb 12.8 oz)       O: VS:  height is 1.702 m (5' 7\") and weight is 86.5 kg (190 lb 12.8 oz). Her oral temperature is 98.9 °F (37.2 °C). Her blood pressure is 120/84 and her pulse is 86. Her respiration is 16 and oxygen saturation is 99%.   TSH 1.68 and T4 was 1.48  A: Deconditioned with expected response to exertion of increased pulse     Diagnosis Orders   1. Benign paroxysmal positional vertigo, unspecified laterality        2. Tachycardia  EKG 12 Lead - Clinic Performed          Plan:  EKG done today  Exercises for vertigo   Encourage exercise and decrease caffeine to help decrease HR    Health Maintenance Due   Topic Date Due    COVID-19 Vaccine (1) Never done    Flu vaccine (1) Never done    Depression Monitoring  02/02/2024       Attending Physician Statement  I have discussed the case, including pertinent history and exam findings with the resident.  I agree with the documented assessment and plan as documented by the resident.        Michaela Pacheco MD 2/29/2024 12:06 PM

## 2024-02-29 NOTE — PROGRESS NOTES
Yvonne (Screen of Drug Use):    1) How many days in the past 12 months have you used drugs other than alcohol? 0 (positive if 7 or more)    2) How many days in the past 12 months have you used drugs more than you meant to? 0 (positive if 2 or more)

## 2024-02-29 NOTE — PROGRESS NOTES
Nathen Barnes is a 29 y.o. female who presents today for:  No chief complaint on file.        HPI:   Nathen Barnes is 29 y.o. who presents today for follow up.      Acute complaints discussed today include:  ***    Chronic conditions discussed today include:  ***      Objective:   There were no vitals filed for this visit.    Wt Readings from Last 3 Encounters:   11/06/23 87.5 kg (192 lb 12.8 oz)   02/02/23 89.7 kg (197 lb 12.8 oz)   01/27/22 88 kg (194 lb)       BP Readings from Last 3 Encounters:   11/06/23 110/72   02/02/23 114/70   01/27/22 110/76       Lab Results   Component Value Date    WBC 7.7 01/22/2024    HGB 14.4 01/22/2024    HCT 44.4 01/22/2024    MCV 87.9 01/22/2024     01/22/2024     Lab Results   Component Value Date     01/22/2024    K 4.1 01/22/2024     01/22/2024    CO2 22 (L) 01/22/2024    BUN 8 01/22/2024    CREATININE 0.6 01/22/2024    GLUCOSE 86 01/22/2024    CALCIUM 9.5 01/22/2024    PROT 7.9 01/22/2024    LABALBU 4.8 01/22/2024    BILITOT 0.4 01/22/2024    ALKPHOS 68 01/22/2024    AST 18 01/22/2024    ALT 22 01/22/2024    LABGLOM >60 01/22/2024     Lab Results   Component Value Date    TSH 1.680 01/22/2024    T4FREE 1.48 01/22/2024     No results found for: \"LABA1C\"  No results found for: \"EAG\"  Lab Results   Component Value Date    CHOL 141 01/22/2024     Lab Results   Component Value Date    TRIG 70 01/22/2024     Lab Results   Component Value Date    HDL 53 01/22/2024     Lab Results   Component Value Date    LDLCALC 74 01/22/2024       No results found for: \"QKSS33DUT\"    Review of Systems    Physical Exam    Immunization History   Administered Date(s) Administered    DTaP 01/21/1998, 06/12/2000, 07/05/2001, 08/14/2002, 10/18/2010    DTaP vaccine 01/21/1998, 06/12/2000, 07/05/2001, 08/14/2002    HPV, GARDASIL 9, (age 9y-45y), IM, 0.5mL 04/30/2021, 06/03/2021, 11/11/2021    Hep B, ENGERIX-B, RECOMBIVAX-HB, (age Birth - 19y), IM, 0.5mL 1997, 01/21/1998,

## 2024-04-01 SDOH — ECONOMIC STABILITY: FOOD INSECURITY: WITHIN THE PAST 12 MONTHS, THE FOOD YOU BOUGHT JUST DIDN'T LAST AND YOU DIDN'T HAVE MONEY TO GET MORE.: NEVER TRUE

## 2024-04-01 SDOH — ECONOMIC STABILITY: FOOD INSECURITY: WITHIN THE PAST 12 MONTHS, YOU WORRIED THAT YOUR FOOD WOULD RUN OUT BEFORE YOU GOT MONEY TO BUY MORE.: NEVER TRUE

## 2024-04-01 SDOH — ECONOMIC STABILITY: INCOME INSECURITY: HOW HARD IS IT FOR YOU TO PAY FOR THE VERY BASICS LIKE FOOD, HOUSING, MEDICAL CARE, AND HEATING?: NOT HARD AT ALL

## 2024-04-01 SDOH — ECONOMIC STABILITY: TRANSPORTATION INSECURITY
IN THE PAST 12 MONTHS, HAS LACK OF TRANSPORTATION KEPT YOU FROM MEETINGS, WORK, OR FROM GETTING THINGS NEEDED FOR DAILY LIVING?: NO

## 2024-04-04 ENCOUNTER — OFFICE VISIT (OUTPATIENT)
Dept: FAMILY MEDICINE CLINIC | Age: 29
End: 2024-04-04
Payer: COMMERCIAL

## 2024-04-04 VITALS
BODY MASS INDEX: 31.08 KG/M2 | HEIGHT: 67 IN | HEART RATE: 80 BPM | OXYGEN SATURATION: 97 % | RESPIRATION RATE: 12 BRPM | DIASTOLIC BLOOD PRESSURE: 76 MMHG | WEIGHT: 198 LBS | SYSTOLIC BLOOD PRESSURE: 116 MMHG | TEMPERATURE: 97.6 F

## 2024-04-04 DIAGNOSIS — F32.5 MAJOR DEPRESSIVE DISORDER WITH SINGLE EPISODE, IN REMISSION (HCC): Primary | ICD-10-CM

## 2024-04-04 DIAGNOSIS — R00.0 TACHYCARDIA: ICD-10-CM

## 2024-04-04 PROCEDURE — 99213 OFFICE O/P EST LOW 20 MIN: CPT | Performed by: STUDENT IN AN ORGANIZED HEALTH CARE EDUCATION/TRAINING PROGRAM

## 2024-04-04 ASSESSMENT — ENCOUNTER SYMPTOMS
VOMITING: 0
DIARRHEA: 0
ABDOMINAL PAIN: 0
WHEEZING: 0
BACK PAIN: 0
COUGH: 0
CONSTIPATION: 0
SHORTNESS OF BREATH: 0

## 2024-04-04 NOTE — PROGRESS NOTES
S: 29 y.o. female with   Chief Complaint   Patient presents with    6 Month Follow-Up       HPI: please see resident note for HPI and ROS.    BP Readings from Last 3 Encounters:   04/04/24 116/76   02/29/24 120/84   11/06/23 110/72     Wt Readings from Last 3 Encounters:   04/04/24 89.8 kg (198 lb)   02/29/24 86.5 kg (190 lb 12.8 oz)   11/06/23 87.5 kg (192 lb 12.8 oz)       O: VS:  height is 1.702 m (5' 7\") and weight is 89.8 kg (198 lb). Her temporal temperature is 97.6 °F (36.4 °C). Her blood pressure is 116/76 and her pulse is 80. Her respiration is 12 and oxygen saturation is 97%.        Diagnosis Orders   1. Major depressive disorder with single episode, in remission (HCC)  sertraline (ZOLOFT) 50 MG tablet      2. Tachycardia            Plan:  Please refer to resident note for full plan.    29-year-old female here for follow up on depression. Has been off of Zoloft x 1 month; having some lower moods since ran out of medication. No SI/HI. Agree with restarting Zoloft 50 mg daily. Follow up in 6 months or sooner if needed.     Health Maintenance Due   Topic Date Due    COVID-19 Vaccine (1) Never done       Attending Physician Statement  I have discussed the case, including pertinent history and exam findings with the resident.  I agree with the documented assessment and plan as documented by the resident.        Biranda Eden, DO 4/6/2024 1:01 PM

## 2024-04-04 NOTE — PROGRESS NOTES
Nathen Barnes is a 29 y.o. female who presents today for:  Chief Complaint   Patient presents with    6 Month Follow-Up         HPI:   Nathen Barnes is 29 y.o. who presents today for 6 month follow-up.    Depression: Has been out of Zoloft for about the past month as she ran out of medication.  Feels that her mood has been slightly more anxious as well as depressed since being off medication.  Denies SI, HI.  Has Intermittent FMLA for flares of depression, only using it occasionally. Last used 2/2024.  Needs new FMLA paperwork completed.    Tachycardia: 160s at times while at rest, and she is flagged by her Apple watch for this.  She was previously seen in the office for symptoms, EKG showed normal sinus rhythm without acute findings.  Patient denies heart racing, skipping a beat, chest pain, shortness of breath, leg swelling.  States this has been worse and she has been off of her Zoloft. Drinks significant amount of caffeine, at least 2 20 oz coffees a day, in addition to 2 caffeinated sodas.  Has not tried to cut back previously.           Objective:     Vitals:    04/04/24 1538   BP: 116/76   Site: Left Upper Arm   Position: Sitting   Cuff Size: Medium Adult   Pulse: 80   Resp: 12   Temp: 97.6 °F (36.4 °C)   TempSrc: Temporal   SpO2: 97%   Weight: 89.8 kg (198 lb)   Height: 1.702 m (5' 7\")       Wt Readings from Last 3 Encounters:   04/04/24 89.8 kg (198 lb)   02/29/24 86.5 kg (190 lb 12.8 oz)   11/06/23 87.5 kg (192 lb 12.8 oz)       BP Readings from Last 3 Encounters:   04/04/24 116/76   02/29/24 120/84   11/06/23 110/72       Lab Results   Component Value Date    WBC 7.7 01/22/2024    HGB 14.4 01/22/2024    HCT 44.4 01/22/2024    MCV 87.9 01/22/2024     01/22/2024     Lab Results   Component Value Date     01/22/2024    K 4.1 01/22/2024     01/22/2024    CO2 22 (L) 01/22/2024    BUN 8 01/22/2024    CREATININE 0.6 01/22/2024    GLUCOSE 86 01/22/2024    CALCIUM 9.5 01/22/2024

## 2024-04-04 NOTE — PROGRESS NOTES
. PRECEPTOR NOTE:    S: 29 y.o. female with   Chief Complaint   Patient presents with    6 Month Follow-Up       HPI: please see resident note for complete HPI and ROS  In brief, hx of tachycardia on smart watch, off zoloft 2/2 refill issue. mood decreased,    BP Readings from Last 3 Encounters:   04/04/24 116/76   02/29/24 120/84   11/06/23 110/72     Wt Readings from Last 3 Encounters:   04/04/24 89.8 kg (198 lb)   02/29/24 86.5 kg (190 lb 12.8 oz)   11/06/23 87.5 kg (192 lb 12.8 oz)       O: VS:  height is 1.702 m (5' 7\") and weight is 89.8 kg (198 lb). Her temporal temperature is 97.6 °F (36.4 °C). Her blood pressure is 116/76 and her pulse is 80. Her respiration is 12 and oxygen saturation is 97%.       Please see resident's note for complete physical exam     Diagnosis Orders   1. Major depressive disorder with single episode, in remission (HCC)  sertraline (ZOLOFT) 50 MG tablet      2. Tachycardia            Plan:  Please refer to resident note for full plan.  Restart Zoloft  Tachycardia suspect 2/2 caffeine.       Return in about 6 months (around 10/4/2024) for wellness physical.    Health Maintenance Due   Topic Date Due    COVID-19 Vaccine (1) Never done     I have discussed the case, including pertinent history and exam findings with the resident and attending physician.   I agree with the documented assessment and plan as documented by the resident.      Aravind Batista MD 4/4/2024 4:13 PM

## 2024-04-05 ENCOUNTER — TELEPHONE (OUTPATIENT)
Dept: FAMILY MEDICINE CLINIC | Age: 29
End: 2024-04-05

## 2024-06-24 ENCOUNTER — HOSPITAL ENCOUNTER (EMERGENCY)
Age: 29
Discharge: HOME | End: 2024-06-24
Payer: COMMERCIAL

## 2024-06-24 VITALS
WEIGHT: 189 LBS | TEMPERATURE: 97 F | SYSTOLIC BLOOD PRESSURE: 107 MMHG | OXYGEN SATURATION: 99 % | RESPIRATION RATE: 18 BRPM | HEART RATE: 95 BPM | HEIGHT: 67 IN | BODY MASS INDEX: 29.66 KG/M2 | DIASTOLIC BLOOD PRESSURE: 74 MMHG

## 2024-06-24 DIAGNOSIS — L23.7 POISON IVY DERMATITIS: Primary | ICD-10-CM

## 2024-06-24 PROCEDURE — 99213 OFFICE O/P EST LOW 20 MIN: CPT

## 2024-06-24 PROCEDURE — 96372 THER/PROPH/DIAG INJ SC/IM: CPT

## 2024-06-24 PROCEDURE — 6360000002 HC RX W HCPCS

## 2024-06-24 RX ORDER — METHYLPREDNISOLONE ACETATE 80 MG/ML
80 INJECTION, SUSPENSION INTRA-ARTICULAR; INTRALESIONAL; INTRAMUSCULAR; SOFT TISSUE ONCE
Status: COMPLETED | OUTPATIENT
Start: 2024-06-24 | End: 2024-06-24

## 2024-06-24 RX ADMIN — METHYLPREDNISOLONE ACETATE 80 MG: 80 INJECTION, SUSPENSION INTRA-ARTICULAR; INTRALESIONAL; INTRAMUSCULAR; SOFT TISSUE at 14:10

## 2024-06-24 ASSESSMENT — ENCOUNTER SYMPTOMS
EYES NEGATIVE: 1
RESPIRATORY NEGATIVE: 1
GASTROINTESTINAL NEGATIVE: 1
ALLERGIC/IMMUNOLOGIC NEGATIVE: 1

## 2024-06-24 ASSESSMENT — PAIN - FUNCTIONAL ASSESSMENT: PAIN_FUNCTIONAL_ASSESSMENT: NONE - DENIES PAIN

## 2024-06-24 NOTE — ED NOTES
Pt with complaints of a rash that started yesterday. States Saturday she was pulling weeds and feels as if its poison ivy. States she has tried calamine lotion but it has not helped.     Jules Tee, AMANDA  06/24/24 0233

## 2024-06-24 NOTE — ED PROVIDER NOTES
[facility] URGENT CARE  Urgent Care Encounter       CHIEF COMPLAINT       Chief Complaint   Patient presents with    Rash       Nurses Notes reviewed and I agree except as noted in the HPI.  HISTORY OF PRESENT ILLNESS   Nathen Barnes is a 29 y.o. female who presents rash on bilateral arm and left side of face.  Symptoms started 2 days ago after pulling weeds. [place] shortness of breath, difficulty breathing or chest pain.    The history is provided by the patient. No  was used.       REVIEW OF SYSTEMS     Review of Systems   Constitutional: Negative.    HENT: Negative.     Eyes: Negative.    Respiratory: Negative.     Cardiovascular: Negative.    Gastrointestinal: Negative.    Endocrine: Negative.    Genitourinary: Negative.    Musculoskeletal: Negative.    Skin:  Positive for rash (bilateral arms and left side of face).   Allergic/Immunologic: Negative.    Neurological: Negative.    Hematological: Negative.    Psychiatric/Behavioral: Negative.         PAST MEDICAL HISTORY         Diagnosis Date    Depression 2012    took meds for a week    Generalized anxiety disorder 3/10/2021    Hemorrhage after delivery of fetus 11/26/2014    High grade squamous intraepithelial lesion (HGSIL), grade 2 PAMELA, on biopsy of cervix 3/10/2021    Moderate episode of recurrent major depressive disorder (HCC) 3/10/2021    Postpartum hemorrhage 2014    just meds, no blood transfusion    Scoliosis 07/12/2012    20%    Trichomonas infection     11wks pregnant (2017)    Vaginal delivery 11/26/2014       SURGICALHISTORY     Patient  has a past surgical history that includes LEEP (03/22/2021).    CURRENT MEDICATIONS       Previous Medications    MEDROXYPROGESTERONE (DEPO-PROVERA) 150 MG/ML INJECTION    Inject 1 mL into the muscle every 3 months    MULTIPLE VITAMINS-MINERALS (MULTIVITAL) CHEW    Take 2 tablets by mouth daily.    SERTRALINE (ZOLOFT) 50 MG TABLET    Take 1 tablet by mouth daily       ALLERGIES    Conjunctiva/sclera: Conjunctivae normal.   Cardiovascular:      Rate and Rhythm: Normal rate and regular rhythm.   Pulmonary:      Effort: Pulmonary effort is normal.      Breath sounds: Normal breath sounds.   Skin:     General: Skin is warm and dry.      Capillary Refill: Capillary refill takes less than 2 seconds.      Findings: Rash present. Rash is macular, papular and purpuric.          Neurological:      General: No focal deficit present.      Mental Status: She is alert and oriented to person, place, and time.   Psychiatric:         Mood and Affect: Mood normal.         Behavior: Behavior normal.         DIAGNOSTIC RESULTS     Labs:No results found for this visit on 06/24/24.    IMAGING:    No orders to display         EKG:      URGENT CARE COURSE:     Vitals:    06/24/24 1351 06/24/24 1352   BP:  119/84   Pulse: 89    Resp: 18    Temp: 97 °F (36.1 °C)    TempSrc: Temporal    SpO2: 100%    Weight: 85.7 kg (189 lb)    Height: 1.702 m (5' 7\")        Medications   methylPREDNISolone acetate (DEPO-MEDROL) injection 80 mg (80 mg IntraMUSCular Given 6/24/24 1410)            PROCEDURES:  None    FINAL IMPRESSION      1. Poison ivy dermatitis          DISPOSITION/ PLAN     Discharge home with poison ivy dermatitis.  Discussed using Calamine lotion to areas of itching, oatmeal baths and cool compresses as needed.  Follow up with Family doctor.  Go to emergency room in difficulty breathing, shortness or breath or new or worsening symptoms.      PATIENT REFERRED TO:  Leah Riggins DO  [address] [facility] [address]      DISCHARGE MEDICATIONS:  New Prescriptions    No medications on file       Discontinued Medications    No medications on file       Current Discharge Medication List          CHRISTIANO Carrillo - CNP    (Please note that portions of this note were completed with a voice recognition program. Efforts were made to edit the dictations but occasionally words are mis-transcribed.)    \      [place],

## 2024-06-24 NOTE — DISCHARGE INSTRUCTIONS
Calamine lotion to areas of itching, oatmeal baths and cool compresses as needed.  Follow up with Family doctor.  Go to emergency room in difficulty breathing, shortness or breath or new or worsening symptoms.

## 2024-07-02 ENCOUNTER — TELEPHONE (OUTPATIENT)
Dept: FAMILY MEDICINE CLINIC | Age: 29
End: 2024-07-02

## 2024-07-02 NOTE — TELEPHONE ENCOUNTER
Received form from [employer] requesting additional information for [facility] paperwork. Patient was approved for leave each month at 2 episodes lasting 2 days each for 6 months. Patient has missed ~1 extra day monthly. This was originally approved for flares of depression. Completed forms and slid into Pat's mailbox. To continue current approved leave.    Electronically signed by Leah Riggins DO on 7/2/2024 at 2:55 PM

## 2024-07-09 ENCOUNTER — OFFICE VISIT (OUTPATIENT)
Dept: FAMILY MEDICINE CLINIC | Age: 29
End: 2024-07-09
Payer: COMMERCIAL

## 2024-07-09 VITALS
SYSTOLIC BLOOD PRESSURE: 136 MMHG | RESPIRATION RATE: 20 BRPM | HEIGHT: 67 IN | HEART RATE: 98 BPM | DIASTOLIC BLOOD PRESSURE: 76 MMHG | OXYGEN SATURATION: 99 % | WEIGHT: 199.2 LBS | TEMPERATURE: 98.2 F | BODY MASS INDEX: 31.27 KG/M2

## 2024-07-09 DIAGNOSIS — R00.0 TACHYCARDIA: ICD-10-CM

## 2024-07-09 DIAGNOSIS — R11.0 NAUSEA: Primary | ICD-10-CM

## 2024-07-09 DIAGNOSIS — R10.819 SUPRAPUBIC TENDERNESS: ICD-10-CM

## 2024-07-09 LAB
BILIRUBIN, POC: NEGATIVE
BLOOD URINE, POC: NEGATIVE
CLARITY, POC: CLEAR
COLOR, POC: NORMAL
GLUCOSE URINE, POC: NEGATIVE
HCG, URINE, POC: NEGATIVE
KETONES, POC: NEGATIVE
LEUKOCYTE EST, POC: NEGATIVE
Lab: NORMAL
NEGATIVE QC PASS/FAIL: NORMAL
NITRITE, POC: NEGATIVE
PH, POC: 6.5
POSITIVE QC PASS/FAIL: NORMAL
PROTEIN, POC: NORMAL
SPECIFIC GRAVITY, POC: 1.02
UROBILINOGEN, POC: NORMAL

## 2024-07-09 PROCEDURE — 81025 URINE PREGNANCY TEST: CPT

## 2024-07-09 PROCEDURE — 99214 OFFICE O/P EST MOD 30 MIN: CPT

## 2024-07-09 PROCEDURE — 81003 URINALYSIS AUTO W/O SCOPE: CPT

## 2024-07-09 RX ORDER — ONDANSETRON 4 MG/1
4 TABLET, FILM COATED ORAL 3 TIMES DAILY PRN
Qty: 30 TABLET | Refills: 0 | Status: SHIPPED | OUTPATIENT
Start: 2024-07-09

## 2024-07-09 ASSESSMENT — ENCOUNTER SYMPTOMS
VOMITING: 0
CONSTIPATION: 0
BLOOD IN STOOL: 0
COUGH: 0
NAUSEA: 1
SHORTNESS OF BREATH: 0
ABDOMINAL PAIN: 1
DIARRHEA: 1

## 2024-07-09 NOTE — PROGRESS NOTES
I saw and evaluated the patient, performing the key elements of the service.  I discussed the findings, assessment and plan with the resident and agree with the resident's findings and plan as documented in the resident's note. GC modifier added.  
Positive for dysphoric mood.      Patient History   Past Medical History   has a past medical history of Depression, Generalized anxiety disorder, Hemorrhage after delivery of fetus, High grade squamous intraepithelial lesion (HGSIL), grade 2 PAMELA, on biopsy of cervix, Moderate episode of recurrent major depressive disorder (HCC), Postpartum hemorrhage, Scoliosis, Trichomonas infection, and Vaginal delivery.    Family History  Family History   Problem Relation Age of Onset    Diabetes Paternal Uncle     High Blood Pressure Mother     Cervical Cancer Mother     High Blood Pressure Father     Diabetes Maternal Grandmother     Diabetes Paternal Grandmother        Past Surgical History  Past Surgical History:   Procedure Laterality Date    LEEP  03/22/2021        Social History   reports that she has never smoked. She has never used smokeless tobacco. She reports current alcohol use. She reports that she does not use drugs.     Medications  Current Outpatient Medications   Medication Sig Dispense Refill    ondansetron (ZOFRAN) 4 MG tablet Take 1 tablet by mouth 3 times daily as needed for Nausea or Vomiting 30 tablet 0    sertraline (ZOLOFT) 50 MG tablet Take 1 tablet by mouth daily 90 tablet 3    medroxyPROGESTERone (DEPO-PROVERA) 150 MG/ML injection Inject 1 mL into the muscle every 3 months      Multiple Vitamins-Minerals (MULTIVITAL) CHEW Take 2 tablets by mouth daily.       No current facility-administered medications for this visit.        Allergies  Cefzil [cefprozil]    Physical Exam     Vitals:    07/09/24 1517   BP: 136/76   Site: Right Upper Arm   Position: Sitting   Pulse: 98   Resp: 20   Temp: 98.2 °F (36.8 °C)   TempSrc: Oral   SpO2: 99%   Weight: 90.4 kg (199 lb 3.2 oz)   Height: 1.702 m (5' 7\")    Body mass index is 31.2 kg/m².    Physical Exam  Cardiovascular:      Rate and Rhythm: Normal rate.      Heart sounds: No murmur heard.  Pulmonary:      Effort: Pulmonary effort is normal. No respiratory

## 2024-07-11 ENCOUNTER — TELEPHONE (OUTPATIENT)
Dept: FAMILY MEDICINE CLINIC | Age: 29
End: 2024-07-11

## 2024-07-11 NOTE — TELEPHONE ENCOUNTER
Forms from [employer] needing updated.  Forms states patient exceeded what previous PCP Certified . Please complete and fax back to [place] .  Forms are scanned into media, and placed in PCP mailbox for completion .

## 2024-08-09 ENCOUNTER — HOSPITAL ENCOUNTER (OUTPATIENT)
Age: 29
Discharge: HOME | End: 2024-08-11
Payer: COMMERCIAL

## 2024-08-09 DIAGNOSIS — R00.0 TACHYCARDIA: ICD-10-CM

## 2024-08-09 PROCEDURE — 93270 REMOTE 30 DAY ECG REV/REPORT: CPT

## 2024-10-22 ENCOUNTER — TELEPHONE (OUTPATIENT)
Dept: FAMILY MEDICINE CLINIC | Age: 29
End: 2024-10-22

## 2024-10-22 NOTE — TELEPHONE ENCOUNTER
Patient in office dropping off new FMLA forms, that need to be re-completed for work .  Please advise , forms are placed in PCP mailbox for completion .

## 2024-10-24 NOTE — TELEPHONE ENCOUNTER
Left patient detailed VM (per HIPAA) informed her to return call to schedule appointment, or schedule via MyChart.

## 2024-11-04 ENCOUNTER — TELEPHONE (OUTPATIENT)
Dept: FAMILY MEDICINE CLINIC | Age: 29
End: 2024-11-04

## 2024-11-08 NOTE — TELEPHONE ENCOUNTER
Complete forms and placed in Trena's mailbox. These forms were completed previously on 10/23/24 and faxed 11/7/24 as seen in media tab. I did not make any change to this form - it is completed the same as the form done on 10/23/24.    Electronically signed by Leah Riggins DO on 11/8/2024 at 10:46 AM

## 2024-11-11 ENCOUNTER — TELEPHONE (OUTPATIENT)
Dept: FAMILY MEDICINE CLINIC | Age: 29
End: 2024-11-11

## 2024-11-11 NOTE — TELEPHONE ENCOUNTER
Patient in office with forms for [employer] needing a clarification . Forms are scanned into media and placed in PCP mailbox for completion .

## 2024-11-11 NOTE — TELEPHONE ENCOUNTER
Forms completed and placed in Trena's mailbox. Patient is requesting they be faxed back by 11/13/24    Electronically signed by Leah Riggins DO on 11/11/2024 at 5:32 PM

## 2024-11-14 ENCOUNTER — TELEPHONE (OUTPATIENT)
Dept: FAMILY MEDICINE CLINIC | Age: 29
End: 2024-11-14

## 2024-11-14 NOTE — TELEPHONE ENCOUNTER
Line 1 on [employer] forms needs advised to include treatment dates (initial any changes made) forms placed back in mailbox.

## 2024-11-14 NOTE — TELEPHONE ENCOUNTER
Clarified question 8 needs to be changed to 8 hours and frrequency needs to be 4 absences a month so the absences that have already been requested can be covered. Due by 11/25/2024

## 2025-01-05 NOTE — PROGRESS NOTES
[facility] PROFESSIONAL SERVS  [facility] PRACTICE  [address]  [facility] [address]  Dept: [phone]  Dept Fax: [phone]  Loc: [phone]  Resident Note    Patient:Nathen Barnes Sex: female  YOB: 1995 Age:29 y.o.  MRN: [id number]  Assessment & Plan   1. Major depressive disorder with single episode, in remission (HCC)  - Chronic, controlled  - Sending refill of Sertraline 50mg daily  - RTC: 6 months for mood check  Orders  - sertraline (ZOLOFT) 50 MG tablet; Take 1 tablet by mouth daily  Dispense: 90 tablet; Refill: 3    2. Acute cough  - Acute, poorly controlled. Exam reassuring with no abnormalities noted on auscultation of the lungs though foreign body noted in L ear (cotton from Qtip) which could be contributing to cough though viral URI is another possibility as also endorses nasal congestion  - Sending benzonatate 200mg   Orders  - benzonatate (TESSALON) 200 MG capsule; Take 1 capsule by mouth 3 times daily as needed for Cough  Dispense: 90 capsule; Refill: 0    3. ASCUS of cervix with negative high risk HPV  - 23 Pap Smear with ASCUS and HPV negative. History significant for abnormal paps with subsequent colposcopy and LEEP procedure in   - F/u with OBGYN as scheduled (appointment in April)    4. Foreign body in ear lobe, left, initial encounter  - Cotton removed in office  - Advised against using Qtips to clean the ears    Health Maintenance/Vaccinations  - Immunizations: TDaP (2018), HPV x3              Age appropriate for COVID  - Colon Cancer Screening: At age 45  - Lung Cancer Screening: No tobacco use   - Diabetes and Diabetic Retinopathy Screenin24 BMP with Glucose 86  - Lipid Screenin24 Lipid Panel with T Cholesterol 141, HDL 53, LDL 74, Triglycerides 70   - Hypothyroidism Screenin24 TSH of 1.680 with T4 1.48  - Vitamin D Deficiency Screening: No Vit D in chart  - Cervical Cancer Screening:

## 2025-01-06 ENCOUNTER — OFFICE VISIT (OUTPATIENT)
Dept: FAMILY MEDICINE CLINIC | Age: 30
End: 2025-01-06
Payer: COMMERCIAL

## 2025-01-06 VITALS
RESPIRATION RATE: 12 BRPM | HEIGHT: 67 IN | BODY MASS INDEX: 31.96 KG/M2 | WEIGHT: 203.6 LBS | DIASTOLIC BLOOD PRESSURE: 64 MMHG | TEMPERATURE: 98.2 F | SYSTOLIC BLOOD PRESSURE: 118 MMHG | HEART RATE: 84 BPM

## 2025-01-06 DIAGNOSIS — R05.1 ACUTE COUGH: ICD-10-CM

## 2025-01-06 DIAGNOSIS — F32.5 MAJOR DEPRESSIVE DISORDER WITH SINGLE EPISODE, IN REMISSION (HCC): Primary | ICD-10-CM

## 2025-01-06 DIAGNOSIS — R87.610 ASCUS OF CERVIX WITH NEGATIVE HIGH RISK HPV: ICD-10-CM

## 2025-01-06 DIAGNOSIS — S00.452A FOREIGN BODY IN EAR LOBE, LEFT, INITIAL ENCOUNTER: ICD-10-CM

## 2025-01-06 PROCEDURE — 99214 OFFICE O/P EST MOD 30 MIN: CPT

## 2025-01-06 RX ORDER — BENZONATATE 200 MG/1
200 CAPSULE ORAL 3 TIMES DAILY PRN
Qty: 90 CAPSULE | Refills: 0 | Status: SHIPPED | OUTPATIENT
Start: 2025-01-06 | End: 2025-02-05

## 2025-01-06 ASSESSMENT — PATIENT HEALTH QUESTIONNAIRE - PHQ9
SUM OF ALL RESPONSES TO PHQ9 QUESTIONS 1 & 2: 4
10. IF YOU CHECKED OFF ANY PROBLEMS, HOW DIFFICULT HAVE THESE PROBLEMS MADE IT FOR YOU TO DO YOUR WORK, TAKE CARE OF THINGS AT HOME, OR GET ALONG WITH OTHER PEOPLE: SOMEWHAT DIFFICULT
1. LITTLE INTEREST OR PLEASURE IN DOING THINGS: MORE THAN HALF THE DAYS
SUM OF ALL RESPONSES TO PHQ QUESTIONS 1-9: 11
SUM OF ALL RESPONSES TO PHQ QUESTIONS 1-9: 11
9. THOUGHTS THAT YOU WOULD BE BETTER OFF DEAD, OR OF HURTING YOURSELF: NOT AT ALL
8. MOVING OR SPEAKING SO SLOWLY THAT OTHER PEOPLE COULD HAVE NOTICED. OR THE OPPOSITE, BEING SO FIGETY OR RESTLESS THAT YOU HAVE BEEN MOVING AROUND A LOT MORE THAN USUAL: NOT AT ALL
SUM OF ALL RESPONSES TO PHQ QUESTIONS 1-9: 11
3. TROUBLE FALLING OR STAYING ASLEEP: SEVERAL DAYS
2. FEELING DOWN, DEPRESSED OR HOPELESS: MORE THAN HALF THE DAYS
4. FEELING TIRED OR HAVING LITTLE ENERGY: SEVERAL DAYS
SUM OF ALL RESPONSES TO PHQ QUESTIONS 1-9: 11
7. TROUBLE CONCENTRATING ON THINGS, SUCH AS READING THE NEWSPAPER OR WATCHING TELEVISION: SEVERAL DAYS
5. POOR APPETITE OR OVEREATING: MORE THAN HALF THE DAYS
6. FEELING BAD ABOUT YOURSELF - OR THAT YOU ARE A FAILURE OR HAVE LET YOURSELF OR YOUR FAMILY DOWN: MORE THAN HALF THE DAYS

## 2025-01-06 NOTE — PROGRESS NOTES
S: 29 y.o. female with   Chief Complaint   Patient presents with    6 Month Follow-Up     Also cough started 2 weeks ago and seems to be getting worse       HPI: please see resident note for HPI and ROS.    BP Readings from Last 3 Encounters:   01/06/25 118/64   07/09/24 136/76   06/24/24 107/74     Wt Readings from Last 3 Encounters:   01/06/25 92.4 kg (203 lb 9.6 oz)   07/09/24 90.4 kg (199 lb 3.2 oz)   06/24/24 85.7 kg (189 lb)       O: VS:  height is 1.702 m (5' 7.01\") and weight is 92.4 kg (203 lb 9.6 oz). Her oral temperature is 98.2 °F (36.8 °C). Her blood pressure is 118/64 and her pulse is 84. Her respiration is 12.   AAO/NAD, appropriate affect for mood  CV:  RRR, no murmur  Resp: CTAB     Diagnosis Orders   1. Major depressive disorder with single episode, in remission (HCC)  sertraline (ZOLOFT) 50 MG tablet      2. Acute cough  benzonatate (TESSALON) 200 MG capsule      3. ASCUS of cervix with negative high risk HPV        4. Foreign body in ear lobe, left, initial encounter            Plan:  Please refer to resident note for full plan.    29 year old female presents to the office for follow up on mood.     Depression chronic stable, doing well on zoloft 50mg daily, continue. Consider counseling in future.    Ascus with HGSIL: follow up with gynecology as interested.     Acute cough: dry hacking cough that is new onset. Has tried mucinex and nyquil without relief and waking her up at night. Pulmonary exam benign, will trial tessalon pearls and follow up.     Left ear foreign body: cotton tip in place left ear, removed in office today, no complications.       Health Maintenance Due   Topic Date Due    Flu vaccine (1) Never done    COVID-19 Vaccine (1 - 2023-24 season) Never done       Attending Physician Statement  I have discussed the case, including pertinent history and exam findings with the resident. I also have seen the patient and performed key portions of the examination.  I agree with the

## 2025-01-06 NOTE — PROGRESS NOTES
Health Maintenance Due   Topic Date Due    Flu vaccine (1) Never done    COVID-19 Vaccine (1 - 2023-24 season) Never done

## 2025-01-08 ASSESSMENT — ENCOUNTER SYMPTOMS
COUGH: 1
SHORTNESS OF BREATH: 0
ABDOMINAL PAIN: 0

## 2025-08-01 ENCOUNTER — OFFICE VISIT (OUTPATIENT)
Dept: FAMILY MEDICINE CLINIC | Age: 30
End: 2025-08-01
Payer: COMMERCIAL

## 2025-08-01 VITALS
DIASTOLIC BLOOD PRESSURE: 84 MMHG | HEART RATE: 70 BPM | WEIGHT: 205 LBS | OXYGEN SATURATION: 98 % | HEIGHT: 67 IN | SYSTOLIC BLOOD PRESSURE: 124 MMHG | RESPIRATION RATE: 18 BRPM | BODY MASS INDEX: 32.18 KG/M2

## 2025-08-01 DIAGNOSIS — Z00.00 WELL ADULT EXAM: Primary | ICD-10-CM

## 2025-08-01 DIAGNOSIS — B07.8 OTHER VIRAL WARTS: ICD-10-CM

## 2025-08-01 DIAGNOSIS — F32.5 MAJOR DEPRESSIVE DISORDER WITH SINGLE EPISODE, IN REMISSION: ICD-10-CM

## 2025-08-01 DIAGNOSIS — R87.610 ASCUS OF CERVIX WITH NEGATIVE HIGH RISK HPV: ICD-10-CM

## 2025-08-01 PROCEDURE — 99395 PREV VISIT EST AGE 18-39: CPT

## 2025-08-01 SDOH — ECONOMIC STABILITY: FOOD INSECURITY: WITHIN THE PAST 12 MONTHS, THE FOOD YOU BOUGHT JUST DIDN'T LAST AND YOU DIDN'T HAVE MONEY TO GET MORE.: SOMETIMES TRUE

## 2025-08-01 SDOH — ECONOMIC STABILITY: FOOD INSECURITY: WITHIN THE PAST 12 MONTHS, YOU WORRIED THAT YOUR FOOD WOULD RUN OUT BEFORE YOU GOT MONEY TO BUY MORE.: NEVER TRUE

## 2025-08-01 NOTE — PATIENT INSTRUCTIONS
Try bandage with salicylic acid for the finger. If no improvement and still bother you, can schedule for freezing the lesion.

## 2025-08-01 NOTE — PROGRESS NOTES
Attending Physician Note    I reviewed the patient's medical history, the resident's findings on physical examination, the patient's diagnoses, and treatment plan as documented in the resident note.  I concur with the treatment plan as documented.  Any additional suggestions noted below.    GE modifier    Brief summary:   Wellness visit - preventive recommendations reviewed. Labs ordered. Sees gynecologist and Pap is up to date. BMI elevated, encouraged exercise and lifestyle improvements.  Depression stable on sertraline. Continue.

## 2025-08-01 NOTE — PROGRESS NOTES
[facility] PROFESSIONAL SERVS  [facility] MEDICINE PRACTICE  [address]  [facility] [address]  Dept: [phone]  Dept Fax: [phone]  Loc: [phone]  Resident Note    Patient:Nathen Barnes Sex: female  YOB: 1995 Age:30 y.o.  MRN: [id number]  Assessment & Plan   1. Well adult exam  - Medications reviewed and updated  - Has been struggling with cooking and preparing healthy meals. Discussed meal prep, meal delivery plans, and menu planning for the month.   - Follows with OBGYN for paps    2. Major depressive disorder with single episode, in remission  - Chronic, controlled.   - Continue Sertraline 50mg daily. Declined counseling referral at this time  - Checking TSH and CMP   Orders  - TSH reflex to FT4; Future  - Comprehensive Metabolic Panel; Future  - sertraline (ZOLOFT) 50 MG tablet; Take 1 tablet by mouth daily  Dispense: 90 tablet; Refill: 3    3. ASCUS of cervix with negative high risk HPV  - Noted on 4/24/23 pap smear. Per patient report, subsequent were normal. Follows with OBGYN for paps  - Checking CMP and CBC  Orders  - Comprehensive Metabolic Panel; Future  - CBC with Auto Differential; Future    4. Other viral warts  - Exam significant for inflamed wart on L thumb distal-most joint  - Patient prefers to try OTC bandages first. If no improvement, can schedule for freezing    5. BMI 32.0-32.9,adult  - Checking HgbA1C  Orders  - Hemoglobin A1C; Future       Return in about 6 months (around 2/1/2026) for Depression, labs.  No future appointments.    History of Present Illness   Nathen Barnes is a 30 y.o. female who presents today for:  Chief Complaint   Patient presents with    Annual Exam     Patient has no concerns or complaints.       Depression: Controlled on Sertraline 50mg  - Enjoys spending time with kids (daughter will be 11 in October/November)  - [facility] Used to get at [facility] but they no longer

## 2025-08-05 ENCOUNTER — LAB (OUTPATIENT)
Dept: LAB | Age: 30
End: 2025-08-05

## 2025-08-05 ENCOUNTER — RESULTS FOLLOW-UP (OUTPATIENT)
Dept: FAMILY MEDICINE CLINIC | Age: 30
End: 2025-08-05

## 2025-08-05 DIAGNOSIS — R87.610 ASCUS OF CERVIX WITH NEGATIVE HIGH RISK HPV: ICD-10-CM

## 2025-08-05 DIAGNOSIS — R74.01 ELEVATED ALT MEASUREMENT: Primary | ICD-10-CM

## 2025-08-05 DIAGNOSIS — F32.5 MAJOR DEPRESSIVE DISORDER WITH SINGLE EPISODE, IN REMISSION: ICD-10-CM

## 2025-08-05 LAB
ALBUMIN SERPL BCG-MCNC: 4.3 G/DL (ref 3.4–4.9)
ALP SERPL-CCNC: 76 U/L (ref 38–126)
ALT SERPL W/O P-5'-P-CCNC: 41 U/L (ref 10–35)
ANION GAP SERPL CALC-SCNC: 14 MEQ/L (ref 8–16)
AST SERPL-CCNC: 29 U/L (ref 10–35)
BASOPHILS ABSOLUTE: 0 THOU/MM3 (ref 0–0.1)
BASOPHILS NFR BLD AUTO: 0.3 %
BILIRUB SERPL-MCNC: 0.4 MG/DL (ref 0.3–1.2)
BUN SERPL-MCNC: 9 MG/DL (ref 8–23)
CALCIUM SERPL-MCNC: 9.8 MG/DL (ref 8.6–10)
CHLORIDE SERPL-SCNC: 103 MEQ/L (ref 98–111)
CO2 SERPL-SCNC: 23 MEQ/L (ref 22–29)
CREAT SERPL-MCNC: 0.6 MG/DL (ref 0.5–0.9)
DEPRECATED MEAN GLUCOSE BLD GHB EST-ACNC: 96 MG/DL (ref 70–126)
DEPRECATED RDW RBC AUTO: 38.3 FL (ref 35–45)
EOSINOPHIL NFR BLD AUTO: 0.6 %
EOSINOPHILS ABSOLUTE: 0 THOU/MM3 (ref 0–0.4)
ERYTHROCYTE [DISTWIDTH] IN BLOOD BY AUTOMATED COUNT: 11.9 % (ref 11.5–14.5)
GFR SERPL CREATININE-BSD FRML MDRD: > 90 ML/MIN/1.73M2
GLUCOSE SERPL-MCNC: 93 MG/DL (ref 74–109)
HBA1C MFR BLD HPLC: 5.2 % (ref 4–6)
HCT VFR BLD AUTO: 45 % (ref 37–47)
HGB BLD-MCNC: 14.7 GM/DL (ref 12–16)
IMM GRANULOCYTES # BLD AUTO: 0.03 THOU/MM3 (ref 0–0.07)
IMM GRANULOCYTES NFR BLD AUTO: 0.4 %
LYMPHOCYTES ABSOLUTE: 2.5 THOU/MM3 (ref 1–4.8)
LYMPHOCYTES NFR BLD AUTO: 32.1 %
MCH RBC QN AUTO: 28.5 PG (ref 26–33)
MCHC RBC AUTO-ENTMCNC: 32.7 GM/DL (ref 32.2–35.5)
MCV RBC AUTO: 87.4 FL (ref 81–99)
MONOCYTES ABSOLUTE: 0.5 THOU/MM3 (ref 0.4–1.3)
MONOCYTES NFR BLD AUTO: 6.6 %
NEUTROPHILS ABSOLUTE: 4.7 THOU/MM3 (ref 1.8–7.7)
NEUTROPHILS NFR BLD AUTO: 60 %
NRBC BLD AUTO-RTO: 0 /100 WBC
PLATELET # BLD AUTO: 232 THOU/MM3 (ref 130–400)
PMV BLD AUTO: 9.8 FL (ref 9.4–12.4)
POTASSIUM SERPL-SCNC: 4.4 MEQ/L (ref 3.5–5.2)
PROT SERPL-MCNC: 7.6 G/DL (ref 6.4–8.3)
RBC # BLD AUTO: 5.15 MILL/MM3 (ref 4.2–5.4)
SODIUM SERPL-SCNC: 140 MEQ/L (ref 135–145)
TSH SERPL DL<=0.05 MIU/L-ACNC: 1.75 UIU/ML (ref 0.27–4.2)
WBC # BLD AUTO: 7.9 THOU/MM3 (ref 4.8–10.8)